# Patient Record
Sex: MALE | Race: WHITE | Employment: OTHER | URBAN - METROPOLITAN AREA
[De-identification: names, ages, dates, MRNs, and addresses within clinical notes are randomized per-mention and may not be internally consistent; named-entity substitution may affect disease eponyms.]

---

## 2017-01-25 ENCOUNTER — HOSPITAL ENCOUNTER (OUTPATIENT)
Dept: GENERAL RADIOLOGY | Age: 82
Discharge: HOME | End: 2017-01-25
Payer: MEDICARE

## 2017-01-25 DIAGNOSIS — M54.17 LUMBOSACRAL NEURITIS: ICD-10-CM

## 2017-01-25 DIAGNOSIS — M54.16 LUMBAR RADICULOPATHY: ICD-10-CM

## 2017-01-25 PROCEDURE — 72110 X-RAY EXAM L-2 SPINE 4/>VWS: CPT

## 2017-08-14 ENCOUNTER — HOSPITAL ENCOUNTER (OUTPATIENT)
Dept: GENERAL RADIOLOGY | Age: 82
Discharge: HOME | End: 2017-08-14
Attending: PHYSICAL MEDICINE & REHABILITATION
Payer: MEDICARE

## 2017-08-14 ENCOUNTER — HOSPITAL ENCOUNTER (OUTPATIENT)
Dept: MRI IMAGING | Age: 82
Discharge: HOME | End: 2017-08-14
Attending: PHYSICAL MEDICINE & REHABILITATION
Payer: MEDICARE

## 2017-08-14 DIAGNOSIS — M54.17 LUMBOSACRAL NEURITIS: ICD-10-CM

## 2017-08-14 DIAGNOSIS — M54.16 LUMBAR RADICULOPATHY: ICD-10-CM

## 2017-08-14 DIAGNOSIS — M54.17 LUMBOSACRAL RADICULITIS: ICD-10-CM

## 2017-08-14 PROCEDURE — 72110 X-RAY EXAM L-2 SPINE 4/>VWS: CPT

## 2017-08-14 PROCEDURE — 72148 MRI LUMBAR SPINE W/O DYE: CPT

## 2017-11-21 ENCOUNTER — HOSPITAL ENCOUNTER (OUTPATIENT)
Dept: PREADMISSION TESTING | Age: 82
Discharge: HOME | End: 2017-11-21
Payer: MEDICARE

## 2017-11-21 VITALS
DIASTOLIC BLOOD PRESSURE: 73 MMHG | RESPIRATION RATE: 18 BRPM | BODY MASS INDEX: 26.93 KG/M2 | HEIGHT: 68 IN | SYSTOLIC BLOOD PRESSURE: 130 MMHG | TEMPERATURE: 97.8 F | HEART RATE: 63 BPM | OXYGEN SATURATION: 97 % | WEIGHT: 177.69 LBS

## 2017-11-21 LAB
ABO + RH BLD: NORMAL
ALBUMIN SERPL-MCNC: 4.1 G/DL (ref 3.5–5)
ALBUMIN/GLOB SERPL: 1.3 {RATIO} (ref 1.1–2.2)
ALP SERPL-CCNC: 88 U/L (ref 45–117)
ALT SERPL-CCNC: 29 U/L (ref 12–78)
ANION GAP SERPL CALC-SCNC: 12 MMOL/L (ref 5–15)
APPEARANCE UR: CLEAR
APTT PPP: 29.7 SEC (ref 22.1–32.5)
AST SERPL-CCNC: 27 U/L (ref 15–37)
BACTERIA URNS QL MICRO: NEGATIVE /HPF
BASOPHILS # BLD: 0 K/UL (ref 0–0.1)
BASOPHILS NFR BLD: 0 % (ref 0–1)
BILIRUB SERPL-MCNC: 0.5 MG/DL (ref 0.2–1)
BILIRUB UR QL: NEGATIVE
BLOOD GROUP ANTIBODIES SERPL: NORMAL
BUN SERPL-MCNC: 13 MG/DL (ref 6–20)
BUN/CREAT SERPL: 14 (ref 12–20)
CALCIUM SERPL-MCNC: 9.3 MG/DL (ref 8.5–10.1)
CHLORIDE SERPL-SCNC: 105 MMOL/L (ref 97–108)
CO2 SERPL-SCNC: 25 MMOL/L (ref 21–32)
COLOR UR: NORMAL
CREAT SERPL-MCNC: 0.94 MG/DL (ref 0.7–1.3)
EOSINOPHIL # BLD: 0.1 K/UL (ref 0–0.4)
EOSINOPHIL NFR BLD: 2 % (ref 0–7)
EPITH CASTS URNS QL MICRO: NORMAL /LPF
ERYTHROCYTE [DISTWIDTH] IN BLOOD BY AUTOMATED COUNT: 13.6 % (ref 11.5–14.5)
EST. AVERAGE GLUCOSE BLD GHB EST-MCNC: 100 MG/DL
GLOBULIN SER CALC-MCNC: 3.1 G/DL (ref 2–4)
GLUCOSE SERPL-MCNC: 85 MG/DL (ref 65–100)
GLUCOSE UR STRIP.AUTO-MCNC: NEGATIVE MG/DL
HBA1C MFR BLD: 5.1 % (ref 4.2–6.3)
HCT VFR BLD AUTO: 41.5 % (ref 36.6–50.3)
HGB BLD-MCNC: 14.3 G/DL (ref 12.1–17)
HGB UR QL STRIP: NEGATIVE
HYALINE CASTS URNS QL MICRO: NORMAL /LPF (ref 0–5)
INR PPP: 1.1 (ref 0.9–1.1)
KETONES UR QL STRIP.AUTO: NEGATIVE MG/DL
LEUKOCYTE ESTERASE UR QL STRIP.AUTO: NEGATIVE
LYMPHOCYTES # BLD: 1.5 K/UL (ref 0.8–3.5)
LYMPHOCYTES NFR BLD: 19 % (ref 12–49)
MCH RBC QN AUTO: 29.7 PG (ref 26–34)
MCHC RBC AUTO-ENTMCNC: 34.5 G/DL (ref 30–36.5)
MCV RBC AUTO: 86.1 FL (ref 80–99)
MONOCYTES # BLD: 0.7 K/UL (ref 0–1)
MONOCYTES NFR BLD: 9 % (ref 5–13)
NEUTS SEG # BLD: 5.7 K/UL (ref 1.8–8)
NEUTS SEG NFR BLD: 70 % (ref 32–75)
NITRITE UR QL STRIP.AUTO: NEGATIVE
PH UR STRIP: 6 [PH] (ref 5–8)
PLATELET # BLD AUTO: 246 K/UL (ref 150–400)
POTASSIUM SERPL-SCNC: 4.2 MMOL/L (ref 3.5–5.1)
PROT SERPL-MCNC: 7.2 G/DL (ref 6.4–8.2)
PROT UR STRIP-MCNC: NEGATIVE MG/DL
PROTHROMBIN TIME: 10.6 SEC (ref 9–11.1)
RBC # BLD AUTO: 4.82 M/UL (ref 4.1–5.7)
RBC #/AREA URNS HPF: NORMAL /HPF (ref 0–5)
SODIUM SERPL-SCNC: 142 MMOL/L (ref 136–145)
SP GR UR REFRACTOMETRY: 1 (ref 1–1.03)
SPECIMEN EXP DATE BLD: NORMAL
THERAPEUTIC RANGE,PTTT: NORMAL SECS (ref 58–77)
UA: UC IF INDICATED,UAUC: NORMAL
UROBILINOGEN UR QL STRIP.AUTO: 0.2 EU/DL (ref 0.2–1)
WBC # BLD AUTO: 8.1 K/UL (ref 4.1–11.1)
WBC URNS QL MICRO: NORMAL /HPF (ref 0–4)

## 2017-11-21 PROCEDURE — 83036 HEMOGLOBIN GLYCOSYLATED A1C: CPT | Performed by: NEUROLOGICAL SURGERY

## 2017-11-21 PROCEDURE — 85730 THROMBOPLASTIN TIME PARTIAL: CPT | Performed by: NEUROLOGICAL SURGERY

## 2017-11-21 PROCEDURE — 81001 URINALYSIS AUTO W/SCOPE: CPT | Performed by: NEUROLOGICAL SURGERY

## 2017-11-21 PROCEDURE — 80053 COMPREHEN METABOLIC PANEL: CPT | Performed by: NEUROLOGICAL SURGERY

## 2017-11-21 PROCEDURE — 85610 PROTHROMBIN TIME: CPT | Performed by: NEUROLOGICAL SURGERY

## 2017-11-21 PROCEDURE — 36415 COLL VENOUS BLD VENIPUNCTURE: CPT | Performed by: NEUROLOGICAL SURGERY

## 2017-11-21 PROCEDURE — 85025 COMPLETE CBC W/AUTO DIFF WBC: CPT | Performed by: NEUROLOGICAL SURGERY

## 2017-11-21 PROCEDURE — 86900 BLOOD TYPING SEROLOGIC ABO: CPT | Performed by: NEUROLOGICAL SURGERY

## 2017-11-21 PROCEDURE — 93005 ELECTROCARDIOGRAM TRACING: CPT

## 2017-11-21 RX ORDER — LISINOPRIL 10 MG/1
10 TABLET ORAL
COMMUNITY
End: 2018-02-24

## 2017-11-21 RX ORDER — METOPROLOL TARTRATE 50 MG/1
50 TABLET ORAL
COMMUNITY
End: 2018-02-24

## 2017-11-21 RX ORDER — TAMSULOSIN HYDROCHLORIDE 0.4 MG/1
0.4 CAPSULE ORAL
COMMUNITY

## 2017-11-21 NOTE — PERIOP NOTES
[facility]  PREOPERATIVE INSTRUCTIONS    Surgery Date:   11/28/2017 Tuesday   Surgery arrival time given by surgeon: NO  (If [facility] staff will call you between 3pm - 7pm the day before surgery with your arrival time. If your surgery is on a Monday, we will call you the preceding Friday. Please call [phone] after 7pm if you did not receive your arrival time.)  1. Report  to the 2nd Floor Admitting Desk on the day of your surgery. Bring your insurance card, photo identification, and any copayment (if applicable). 2. You must have a responsible adult to drive you home and stay with you the first 24 hours after surgery if you are going home the same day of your surgery. 3. Nothing to eat or drink after midnight the night before surgery. This means NO water, gum, mints, coffee, juice, etc.    4. MEDICATIONS TO TAKE THE MORNING OF SURGERY WITH A SIP OF WATER: Take Metoprolol and tamsulosin. Do not take your lisinopril the morning of surgery. 5. No alcoholic beverages 24 hours before and after your surgery. 6. If you are being admitted to the hospital,please leave personal belongings/luggage in your car until you have an assigned hospital room number. ( The hospital discharge time is 12 PM NOON. Your adult  should be at the hospital prior to the noon discharge time unless otherwise instructed.)   7. STOP Aspirin and/or any non-steroidal anti-inflammatory drugs (i.e. Ibuprofen, Naproxen, Advil, Aleve) as directed by your surgeon. You may take Tylenol. Stop herbal supplements 1 week prior to  surgery. 8. If you are currently taking Plavix, Coumadin,or any other blood-thinning/ anticoagulant medication contact your surgeon for instructions. 9. Wear comfortable clothes. Wear your glasses instead of contacts. Please leave all money, jewelry and valuables at home. No make up, particularly mascara, the day of surgery. 10.  REMOVE ALL body piercings, rings,and jewelry and leave at home.   Wear your hair loose or down, no pony-tails, buns, or any metal hair clips. 11. If you shower the morning of surgery, please do not apply any lotions, powders, or deodorants afterwards. Do not shave any body area within 24 hours of your surgery. 12. Please follow all instructions to avoid any potential surgical cancellation. 13. Should your physical condition change, (i.e. fever, cold, flu, etc.) please notify your surgeon as soon as possible. 14. It is important to be on time. If a situation occurs where you may be delayed, please call:  [phone] / [phone] on the day of surgery. 15. The Preadmission Testing staff can be reached at [phone]. 16.  Special Instructions:  · Use Chlorhexidine Care Fusion wash and sponges 3 days prior to surgery as instructed. · Incentive spirometer given with instructions to practice at home and bring back to the hospital on the day of surgery. · [facility] will contact you if your Hemoglobin A1C is greater than 7.5. · Ensure/Glucerna  sample, nutritional information, and Ensure/Glucerna coupon given. · Pain pamphlet and Call Don't Fall reminder reviewed with patient. ·  parking is complimentary Monday - Friday 7 am - 5 pm  · Bring PTA Medication list day of surgery with the last doses taken documented   Do not bring medication bottles the day of [id number]. Special Instructions:  17. The patient was contacted  in person. He verbalized understanding of all instructions does not need reinforcement.

## 2017-11-21 NOTE — H&P
PAT Pre-Op History & Physical    Patient: Babita Truong                  MRN: [id number]          SSN: [id number]  YOB: 1935          Age: 81 y.o. Sex: male                Subjective:     Patient is a 81 y.o.  male who presents with history of back pain that has been progressing for ten years. Three years ago received an injection that helped for awhile. Recent injections have stopped working. The pain is across his lower back and down into both legs all the way to his feet. He has numbness in both feet upon wakening. It is hard for him to turn over in bed. Too much physical activity makes the pain worse. He rates his pain between a 3/10 and 6/10 and describes it as an ache. He has failed injections, NSAID and application of heat. The patient was evaluated in the surgeon's office and it was determined that the most appropriate plan of care is to proceed with surgical intervention. Patient's PCP Rah Baldwin MD    Patient stated he was going to get dental implants starting in December. MRI done 8/14/17 with findings of 1. Severe spinal stenosis L2-3 with minimal progression since 2014. 2. Moderately severe to severe spinal stenosis L3-4 with minimal progression since 2014. 3. L4-5 at least mild central stenosis with more severe left subarticular zone and foramina stenosis. Minimal if any progression. 4. L5-S1 left greater than right foramina stenosis. 5. L1-2 mild/moderate stenosis. Past Medical History:   Diagnosis Date    Hypertension     PUD (peptic ulcer disease)     Pt states he had a 'peptic ulcer' in his early 25s      Past Surgical History:   Procedure Laterality Date    HX CATARACT REMOVAL Bilateral 2015    HX HERNIA REPAIR Right 02/2015    Right inguinal hernia repair    HX KNEE REPLACEMENT Right 11/2016    HX KNEE REPLACEMENT Left 07/2016      Prior to Admission medications    Medication Sig Start Date End Date Taking?  Authorizing Provider lisinopril (PRINIVIL, ZESTRIL) 10 mg tablet Take 10 mg by mouth every morning. Yes Historical Provider   metoprolol tartrate (LOPRESSOR) 50 mg tablet Take 50 mg by mouth every morning. Yes Historical Provider   tamsulosin (FLOMAX) 0.4 mg capsule Take 0.4 mg by mouth every morning. Takes for 'toenail fungus'   Yes Historical Provider     Current Outpatient Prescriptions   Medication Sig    lisinopril (PRINIVIL, ZESTRIL) 10 mg tablet Take 10 mg by mouth every morning.  metoprolol tartrate (LOPRESSOR) 50 mg tablet Take 50 mg by mouth every morning.  tamsulosin (FLOMAX) 0.4 mg capsule Take 0.4 mg by mouth every morning. Takes for 'toenail fungus'     No current facility-administered medications for this encounter. 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 Patient denies any recent dental procedures or any planned prior to surgery. Patient denies chest pain, tightness, pain radiating down left arm, palpitations. Denies dizziness, visual disturbances, or lightheadedness. Patient denies shortness of breath, wheezing, cough, fever, or chills. Patient denies diarrhea, constipation, or abdominal pain. Patient denies urinary problems including dysuria, hesitancy, urgency, or incontinence. Denies skin breakdown, rashes, insect bites or open area.          Objective:     Patient Vitals for the past 24 hrs:   Temp Pulse Resp BP SpO2   17 1127 97.8 °F (36.6 °C) 63 18 130/73 97 %     Temp (24hrs), Av.8 °F (36.6 °C), Min:97.8 °F (36.6 °C), Max:97.8 °F (36.6 °C)    Body mass index is 27.02 kg/(m^2). Wt Readings from Last 1 Encounters:   11/21/17 80.6 kg (177 lb 11.1 oz)        Physical Exam:     General: Pleasant,  cooperative, no apparent distress, appears stated age. Eyes: Conjunctivae/corneas clear. EOMs intact. Nose: Nares normal.   Mouth/Throat: Lips, mucosa, and tongue normal. Upper and Lower Partials. Lungs: Clear to auscultation bilaterally. Heart: Regular rate and rhythm, S1, S2 normal. No murmur, click, rub or gallop. Abdomen: Soft, non-tender. Bowel sounds normal. No distention. Musculoskeletal:  Decreased ROM to lumbar area. Extremities:  Extremities normal, atraumatic, no cyanosis or edema. Calves                                 supple, non tender to palpation. Pulses: 2+ and symmetric bilateral upper extremities. Cap. refill <2 seconds   Skin: Skin color, texture, turgor normal. No visible open areas, examined fully clothed   Neurologic: CN II-XII grossly intact. Alert and oriented x3. Labs:   Recent Results (from the past 72 hour(s))   CULTURE, MRSA    Collection Time: 11/21/17 11:28 AM   Result Value Ref Range    Special Requests: NO SPECIAL REQUESTS      Culture result: MRSA NOT PRESENT AT 12 HOURS     CBC WITH AUTOMATED DIFF    Collection Time: 11/21/17 12:20 PM   Result Value Ref Range    WBC 8.1 4.1 - 11.1 K/uL    RBC 4.82 4.10 - 5.70 M/uL    HGB 14.3 12.1 - 17.0 g/dL    HCT 41.5 36.6 - 50.3 %    MCV 86.1 80.0 - 99.0 FL    MCH 29.7 26.0 - 34.0 PG    MCHC 34.5 30.0 - 36.5 g/dL    RDW 13.6 11.5 - 14.5 %    PLATELET 301 840 - 517 K/uL    NEUTROPHILS 70 32 - 75 %    LYMPHOCYTES 19 12 - 49 %    MONOCYTES 9 5 - 13 %    EOSINOPHILS 2 0 - 7 %    BASOPHILS 0 0 - 1 %    ABS. NEUTROPHILS 5.7 1.8 - 8.0 K/UL    ABS. LYMPHOCYTES 1.5 0.8 - 3.5 K/UL    ABS. MONOCYTES 0.7 0.0 - 1.0 K/UL    ABS. EOSINOPHILS 0.1 0.0 - 0.4 K/UL    ABS.  BASOPHILS 0.0 0.0 - 0.1 K/UL   METABOLIC PANEL, COMPREHENSIVE    Collection Time: 11/21/17 12:20 PM   Result Value Ref Range    Sodium 142 136 - 145 mmol/L    Potassium 4.2 3.5 - 5.1 mmol/L    Chloride 105 97 - 108 mmol/L    CO2 25 21 - 32 mmol/L    Anion gap 12 5 - 15 mmol/L    Glucose 85 65 - 100 mg/dL    BUN 13 6 - 20 MG/DL    Creatinine 0.94 0.70 - 1.30 MG/DL    BUN/Creatinine ratio 14 12 - 20      GFR est AA >60 >60 ml/min/1.73m2    GFR est non-AA >60 >60 ml/min/1.73m2    Calcium 9.3 8.5 - 10.1 MG/DL    Bilirubin, total 0.5 0.2 - 1.0 MG/DL    ALT (SGPT) 29 12 - 78 U/L    AST (SGOT) 27 15 - 37 U/L    Alk.  phosphatase 88 45 - 117 U/L    Protein, total 7.2 6.4 - 8.2 g/dL    Albumin 4.1 3.5 - 5.0 g/dL    Globulin 3.1 2.0 - 4.0 g/dL    A-G Ratio 1.3 1.1 - 2.2     HEMOGLOBIN A1C WITH EAG    Collection Time: 11/21/17 12:20 PM   Result Value Ref Range    Hemoglobin A1c 5.1 4.2 - 6.3 %    Est. average glucose 100 mg/dL   PROTHROMBIN TIME + INR    Collection Time: 11/21/17 12:20 PM   Result Value Ref Range    INR 1.1 0.9 - 1.1      Prothrombin time 10.6 9.0 - 11.1 sec   PTT    Collection Time: 11/21/17 12:20 PM   Result Value Ref Range    aPTT 29.7 22.1 - 32.5 sec    aPTT, therapeutic range     58.0 - 77.0 SECS   URINALYSIS W/ REFLEX CULTURE    Collection Time: 11/21/17 12:20 PM   Result Value Ref Range    Color YELLOW/STRAW      Appearance CLEAR CLEAR      Specific gravity 1.005 1.003 - 1.030      pH (UA) 6.0 5.0 - 8.0      Protein NEGATIVE  NEG mg/dL    Glucose NEGATIVE  NEG mg/dL    Ketone NEGATIVE  NEG mg/dL    Bilirubin NEGATIVE  NEG      Blood NEGATIVE  NEG      Urobilinogen 0.2 0.2 - 1.0 EU/dL    Nitrites NEGATIVE  NEG      Leukocyte Esterase NEGATIVE  NEG      WBC 0-4 0 - 4 /hpf    RBC 0-5 0 - 5 /hpf    Epithelial cells FEW FEW /lpf    Bacteria NEGATIVE  NEG /hpf    UA:UC IF INDICATED CULTURE NOT INDICATED BY UA RESULT CNI      Hyaline cast 0-2 0 - 5 /lpf   TYPE & SCREEN    Collection Time: 11/21/17 12:20 PM   Result Value Ref Range    Crossmatch Expiration 12/01/2017     ABO/Rh(D) B NEGATIVE     Antibody screen NEG    EKG, 12 LEAD, INITIAL    Collection Time: 11/21/17 12:46 PM   Result Value Ref Range    Ventricular Rate 57 BPM    Atrial Rate 57 BPM    P-R Interval 196 ms    QRS Duration 98 ms    Q-T Interval 412 ms    QTC Calculation (Bezet) 401 ms    Calculated P Axis -16 degrees    Calculated R Axis 19 degrees    Calculated T Axis 33 degrees    Diagnosis       Sinus bradycardia  Cannot rule out Anterior infarct , age undetermined  Abnormal ECG  When compared with ECG of 14-FEB-2015 18:14,  premature atrial complexes are no longer present  Vent. rate has decreased BY  61 BPM         Assessment:     Lumbar Stenosis with Claudication    Plan:     Scheduled for L2-3 L3-4 LAMINECTOMY AND BILATERAL FORAMINOTOMIES. EKG reviewed    Labs reviewed and unremarkable    MRSA Negative    Educated patient to speak to attending surgeon prior to having any planned dental procedures after surgery.      Alfredo Blake NP

## 2017-11-22 LAB
ATRIAL RATE: 57 BPM
BACTERIA SPEC CULT: NORMAL
BACTERIA SPEC CULT: NORMAL
CALCULATED P AXIS, ECG09: -16 DEGREES
CALCULATED R AXIS, ECG10: 19 DEGREES
CALCULATED T AXIS, ECG11: 33 DEGREES
DIAGNOSIS, 93000: NORMAL
P-R INTERVAL, ECG05: 196 MS
Q-T INTERVAL, ECG07: 412 MS
QRS DURATION, ECG06: 98 MS
QTC CALCULATION (BEZET), ECG08: 401 MS
SERVICE CMNT-IMP: NORMAL
VENTRICULAR RATE, ECG03: 57 BPM

## 2017-11-27 ENCOUNTER — ANESTHESIA EVENT (OUTPATIENT)
Dept: SURGERY | Age: 82
DRG: 517 | End: 2017-11-27
Payer: MEDICARE

## 2017-11-28 ENCOUNTER — ANESTHESIA (OUTPATIENT)
Dept: SURGERY | Age: 82
DRG: 517 | End: 2017-11-28
Payer: MEDICARE

## 2017-11-28 ENCOUNTER — HOSPITAL ENCOUNTER (INPATIENT)
Age: 82
LOS: 1 days | Discharge: HOME | DRG: 517 | End: 2017-11-29
Attending: NEUROLOGICAL SURGERY | Admitting: NEUROLOGICAL SURGERY
Payer: MEDICARE

## 2017-11-28 ENCOUNTER — APPOINTMENT (OUTPATIENT)
Dept: GENERAL RADIOLOGY | Age: 82
DRG: 517 | End: 2017-11-28
Attending: NEUROLOGICAL SURGERY
Payer: MEDICARE

## 2017-11-28 DIAGNOSIS — M48.062 LUMBAR STENOSIS WITH NEUROGENIC CLAUDICATION: Primary | ICD-10-CM

## 2017-11-28 PROCEDURE — 77030034849: Performed by: NEUROLOGICAL SURGERY

## 2017-11-28 PROCEDURE — 77030004391 HC BUR FLUT MEDT -C: Performed by: NEUROLOGICAL SURGERY

## 2017-11-28 PROCEDURE — 77030020256 HC SOL INJ NACL 0.9%  500ML: Performed by: NEUROLOGICAL SURGERY

## 2017-11-28 PROCEDURE — 77030026438 HC STYL ET INTUB CARD -A: Performed by: ANESTHESIOLOGY

## 2017-11-28 PROCEDURE — 77030032490 HC SLV COMPR SCD KNE COVD -B: Performed by: NEUROLOGICAL SURGERY

## 2017-11-28 PROCEDURE — 74011000250 HC RX REV CODE- 250: Performed by: NEUROLOGICAL SURGERY

## 2017-11-28 PROCEDURE — 74011000250 HC RX REV CODE- 250

## 2017-11-28 PROCEDURE — 77030011264 HC ELECTRD BLD EXT COVD -A: Performed by: NEUROLOGICAL SURGERY

## 2017-11-28 PROCEDURE — 76060000036 HC ANESTHESIA 2.5 TO 3 HR: Performed by: NEUROLOGICAL SURGERY

## 2017-11-28 PROCEDURE — 77030010507 HC ADH SKN DERMBND J&J -B: Performed by: NEUROLOGICAL SURGERY

## 2017-11-28 PROCEDURE — 77030003666 HC NDL SPINAL BD -A: Performed by: NEUROLOGICAL SURGERY

## 2017-11-28 PROCEDURE — 77030020782 HC GWN BAIR PAWS FLX 3M -B

## 2017-11-28 PROCEDURE — 77030013079 HC BLNKT BAIR HGGR 3M -A: Performed by: ANESTHESIOLOGY

## 2017-11-28 PROCEDURE — 77030003029 HC SUT VCRL J&J -B: Performed by: NEUROLOGICAL SURGERY

## 2017-11-28 PROCEDURE — 77030038222 HC BUR MIDSREX MEDT -D: Performed by: NEUROLOGICAL SURGERY

## 2017-11-28 PROCEDURE — 74011250636 HC RX REV CODE- 250/636: Performed by: NEUROLOGICAL SURGERY

## 2017-11-28 PROCEDURE — 74011250636 HC RX REV CODE- 250/636

## 2017-11-28 PROCEDURE — 77030019908 HC STETH ESOPH SIMS -A: Performed by: ANESTHESIOLOGY

## 2017-11-28 PROCEDURE — 74011250636 HC RX REV CODE- 250/636: Performed by: ANESTHESIOLOGY

## 2017-11-28 PROCEDURE — 77030032490 HC SLV COMPR SCD KNE COVD -B

## 2017-11-28 PROCEDURE — 51798 US URINE CAPACITY MEASURE: CPT

## 2017-11-28 PROCEDURE — 65270000029 HC RM PRIVATE

## 2017-11-28 PROCEDURE — 77030014647 HC SEAL FBRN TISSL BAXT -D: Performed by: NEUROLOGICAL SURGERY

## 2017-11-28 PROCEDURE — 74011250637 HC RX REV CODE- 250/637: Performed by: NEUROLOGICAL SURGERY

## 2017-11-28 PROCEDURE — 77030018836 HC SOL IRR NACL ICUM -A: Performed by: NEUROLOGICAL SURGERY

## 2017-11-28 PROCEDURE — 01NB0ZZ RELEASE LUMBAR NERVE, OPEN APPROACH: ICD-10-PCS | Performed by: NEUROLOGICAL SURGERY

## 2017-11-28 PROCEDURE — 77030013474 HC CRD BPLR DISP ADLR -A: Performed by: NEUROLOGICAL SURGERY

## 2017-11-28 PROCEDURE — 77030002933 HC SUT MCRYL J&J -A: Performed by: NEUROLOGICAL SURGERY

## 2017-11-28 PROCEDURE — 76210000007 HC OR PH I REC 5.5 TO 6 HR: Performed by: NEUROLOGICAL SURGERY

## 2017-11-28 PROCEDURE — 77030029099 HC BN WAX SSPC -A: Performed by: NEUROLOGICAL SURGERY

## 2017-11-28 PROCEDURE — 77030037134 HC WRAP COMPR BACK THER SOLM -B

## 2017-11-28 PROCEDURE — 76010000172 HC OR TIME 2.5 TO 3 HR INTENSV-TIER 1: Performed by: NEUROLOGICAL SURGERY

## 2017-11-28 PROCEDURE — 76000 FLUOROSCOPY <1 HR PHYS/QHP: CPT

## 2017-11-28 PROCEDURE — 77030008684 HC TU ET CUF COVD -B: Performed by: ANESTHESIOLOGY

## 2017-11-28 PROCEDURE — 74011000272 HC RX REV CODE- 272: Performed by: NEUROLOGICAL SURGERY

## 2017-11-28 RX ORDER — SODIUM CHLORIDE 0.9 % (FLUSH) 0.9 %
5-10 SYRINGE (ML) INJECTION AS NEEDED
Status: DISCONTINUED | OUTPATIENT
Start: 2017-11-28 | End: 2017-11-28 | Stop reason: HOSPADM

## 2017-11-28 RX ORDER — SODIUM CHLORIDE 0.9 % (FLUSH) 0.9 %
5-10 SYRINGE (ML) INJECTION EVERY 8 HOURS
Status: DISCONTINUED | OUTPATIENT
Start: 2017-11-28 | End: 2017-11-28 | Stop reason: HOSPADM

## 2017-11-28 RX ORDER — SODIUM CHLORIDE 0.9 % (FLUSH) 0.9 %
5-10 SYRINGE (ML) INJECTION AS NEEDED
Status: DISCONTINUED | OUTPATIENT
Start: 2017-11-28 | End: 2017-11-29 | Stop reason: HOSPADM

## 2017-11-28 RX ORDER — SODIUM CHLORIDE 9 MG/ML
125 INJECTION, SOLUTION INTRAVENOUS CONTINUOUS
Status: DISPENSED | OUTPATIENT
Start: 2017-11-28 | End: 2017-11-29

## 2017-11-28 RX ORDER — AMOXICILLIN 250 MG
1 CAPSULE ORAL DAILY
Status: DISCONTINUED | OUTPATIENT
Start: 2017-11-29 | End: 2017-11-29 | Stop reason: HOSPADM

## 2017-11-28 RX ORDER — IBUPROFEN 600 MG/1
600 TABLET ORAL
Status: DISCONTINUED | OUTPATIENT
Start: 2017-11-28 | End: 2017-11-29 | Stop reason: HOSPADM

## 2017-11-28 RX ORDER — ONDANSETRON 2 MG/ML
INJECTION INTRAMUSCULAR; INTRAVENOUS AS NEEDED
Status: DISCONTINUED | OUTPATIENT
Start: 2017-11-28 | End: 2017-11-28 | Stop reason: HOSPADM

## 2017-11-28 RX ORDER — HYDROCODONE BITARTRATE AND ACETAMINOPHEN 5; 325 MG/1; MG/1
1 TABLET ORAL
Status: DISCONTINUED | OUTPATIENT
Start: 2017-11-28 | End: 2017-11-29 | Stop reason: HOSPADM

## 2017-11-28 RX ORDER — BUPIVACAINE HYDROCHLORIDE AND EPINEPHRINE 5; 5 MG/ML; UG/ML
INJECTION, SOLUTION EPIDURAL; INTRACAUDAL; PERINEURAL AS NEEDED
Status: DISCONTINUED | OUTPATIENT
Start: 2017-11-28 | End: 2017-11-28 | Stop reason: HOSPADM

## 2017-11-28 RX ORDER — ACETAMINOPHEN 325 MG/1
650 TABLET ORAL EVERY 6 HOURS
Status: DISCONTINUED | OUTPATIENT
Start: 2017-11-28 | End: 2017-11-29 | Stop reason: HOSPADM

## 2017-11-28 RX ORDER — NALOXONE HYDROCHLORIDE 0.4 MG/ML
0.4 INJECTION, SOLUTION INTRAMUSCULAR; INTRAVENOUS; SUBCUTANEOUS AS NEEDED
Status: DISCONTINUED | OUTPATIENT
Start: 2017-11-28 | End: 2017-11-29 | Stop reason: HOSPADM

## 2017-11-28 RX ORDER — NEOSTIGMINE METHYLSULFATE 1 MG/ML
INJECTION INTRAVENOUS AS NEEDED
Status: DISCONTINUED | OUTPATIENT
Start: 2017-11-28 | End: 2017-11-28 | Stop reason: HOSPADM

## 2017-11-28 RX ORDER — DIPHENHYDRAMINE HCL 12.5MG/5ML
12.5 ELIXIR ORAL
Status: DISCONTINUED | OUTPATIENT
Start: 2017-11-28 | End: 2017-11-29 | Stop reason: HOSPADM

## 2017-11-28 RX ORDER — CEFAZOLIN SODIUM IN 0.9 % NACL 2 G/50 ML
2 INTRAVENOUS SOLUTION, PIGGYBACK (ML) INTRAVENOUS ONCE
Status: COMPLETED | OUTPATIENT
Start: 2017-11-28 | End: 2017-11-28

## 2017-11-28 RX ORDER — LIDOCAINE HYDROCHLORIDE 10 MG/ML
0.1 INJECTION, SOLUTION EPIDURAL; INFILTRATION; INTRACAUDAL; PERINEURAL AS NEEDED
Status: DISCONTINUED | OUTPATIENT
Start: 2017-11-28 | End: 2017-11-28 | Stop reason: HOSPADM

## 2017-11-28 RX ORDER — METOPROLOL TARTRATE 5 MG/5ML
INJECTION INTRAVENOUS AS NEEDED
Status: DISCONTINUED | OUTPATIENT
Start: 2017-11-28 | End: 2017-11-28 | Stop reason: HOSPADM

## 2017-11-28 RX ORDER — SODIUM CHLORIDE 0.9 % (FLUSH) 0.9 %
5-10 SYRINGE (ML) INJECTION EVERY 8 HOURS
Status: DISCONTINUED | OUTPATIENT
Start: 2017-11-29 | End: 2017-11-29 | Stop reason: HOSPADM

## 2017-11-28 RX ORDER — TAMSULOSIN HYDROCHLORIDE 0.4 MG/1
0.4 CAPSULE ORAL
Status: DISCONTINUED | OUTPATIENT
Start: 2017-11-29 | End: 2017-11-29 | Stop reason: HOSPADM

## 2017-11-28 RX ORDER — ROCURONIUM BROMIDE 10 MG/ML
INJECTION, SOLUTION INTRAVENOUS AS NEEDED
Status: DISCONTINUED | OUTPATIENT
Start: 2017-11-28 | End: 2017-11-28 | Stop reason: HOSPADM

## 2017-11-28 RX ORDER — MORPHINE SULFATE 2 MG/ML
2 INJECTION, SOLUTION INTRAMUSCULAR; INTRAVENOUS
Status: DISCONTINUED | OUTPATIENT
Start: 2017-11-28 | End: 2017-11-29 | Stop reason: HOSPADM

## 2017-11-28 RX ORDER — PROPOFOL 10 MG/ML
INJECTION, EMULSION INTRAVENOUS AS NEEDED
Status: DISCONTINUED | OUTPATIENT
Start: 2017-11-28 | End: 2017-11-28 | Stop reason: HOSPADM

## 2017-11-28 RX ORDER — METHYLPREDNISOLONE ACETATE 40 MG/ML
INJECTION, SUSPENSION INTRA-ARTICULAR; INTRALESIONAL; INTRAMUSCULAR; SOFT TISSUE AS NEEDED
Status: DISCONTINUED | OUTPATIENT
Start: 2017-11-28 | End: 2017-11-28 | Stop reason: HOSPADM

## 2017-11-28 RX ORDER — METOPROLOL SUCCINATE 50 MG/1
50 TABLET, EXTENDED RELEASE ORAL DAILY
Status: DISCONTINUED | OUTPATIENT
Start: 2017-11-28 | End: 2017-11-28 | Stop reason: SDUPTHER

## 2017-11-28 RX ORDER — GLYCOPYRROLATE 0.2 MG/ML
INJECTION INTRAMUSCULAR; INTRAVENOUS AS NEEDED
Status: DISCONTINUED | OUTPATIENT
Start: 2017-11-28 | End: 2017-11-28 | Stop reason: HOSPADM

## 2017-11-28 RX ORDER — METOPROLOL TARTRATE 50 MG/1
50 TABLET ORAL
Status: DISCONTINUED | OUTPATIENT
Start: 2017-11-29 | End: 2017-11-29 | Stop reason: HOSPADM

## 2017-11-28 RX ORDER — HYDROMORPHONE HYDROCHLORIDE 1 MG/ML
.25-1 INJECTION, SOLUTION INTRAMUSCULAR; INTRAVENOUS; SUBCUTANEOUS
Status: DISCONTINUED | OUTPATIENT
Start: 2017-11-28 | End: 2017-11-28 | Stop reason: HOSPADM

## 2017-11-28 RX ORDER — MIDAZOLAM HYDROCHLORIDE 1 MG/ML
INJECTION, SOLUTION INTRAMUSCULAR; INTRAVENOUS AS NEEDED
Status: DISCONTINUED | OUTPATIENT
Start: 2017-11-28 | End: 2017-11-28 | Stop reason: HOSPADM

## 2017-11-28 RX ORDER — LISINOPRIL 20 MG/1
10 TABLET ORAL
Status: DISCONTINUED | OUTPATIENT
Start: 2017-11-29 | End: 2017-11-29 | Stop reason: HOSPADM

## 2017-11-28 RX ORDER — SODIUM CHLORIDE, SODIUM LACTATE, POTASSIUM CHLORIDE, CALCIUM CHLORIDE 600; 310; 30; 20 MG/100ML; MG/100ML; MG/100ML; MG/100ML
100 INJECTION, SOLUTION INTRAVENOUS CONTINUOUS
Status: DISCONTINUED | OUTPATIENT
Start: 2017-11-28 | End: 2017-11-28 | Stop reason: HOSPADM

## 2017-11-28 RX ORDER — FENTANYL CITRATE 50 UG/ML
INJECTION, SOLUTION INTRAMUSCULAR; INTRAVENOUS AS NEEDED
Status: DISCONTINUED | OUTPATIENT
Start: 2017-11-28 | End: 2017-11-28 | Stop reason: HOSPADM

## 2017-11-28 RX ORDER — DOCUSATE SODIUM 100 MG/1
100 CAPSULE, LIQUID FILLED ORAL 2 TIMES DAILY
Status: DISCONTINUED | OUTPATIENT
Start: 2017-11-28 | End: 2017-11-29 | Stop reason: HOSPADM

## 2017-11-28 RX ORDER — DEXAMETHASONE SODIUM PHOSPHATE 4 MG/ML
INJECTION, SOLUTION INTRA-ARTICULAR; INTRALESIONAL; INTRAMUSCULAR; INTRAVENOUS; SOFT TISSUE AS NEEDED
Status: DISCONTINUED | OUTPATIENT
Start: 2017-11-28 | End: 2017-11-28 | Stop reason: HOSPADM

## 2017-11-28 RX ORDER — CEFAZOLIN SODIUM IN 0.9 % NACL 2 G/50 ML
2 INTRAVENOUS SOLUTION, PIGGYBACK (ML) INTRAVENOUS EVERY 8 HOURS
Status: COMPLETED | OUTPATIENT
Start: 2017-11-28 | End: 2017-11-29

## 2017-11-28 RX ORDER — ONDANSETRON 2 MG/ML
4 INJECTION INTRAMUSCULAR; INTRAVENOUS
Status: DISCONTINUED | OUTPATIENT
Start: 2017-11-28 | End: 2017-11-29 | Stop reason: HOSPADM

## 2017-11-28 RX ORDER — LIDOCAINE HYDROCHLORIDE 20 MG/ML
INJECTION, SOLUTION EPIDURAL; INFILTRATION; INTRACAUDAL; PERINEURAL AS NEEDED
Status: DISCONTINUED | OUTPATIENT
Start: 2017-11-28 | End: 2017-11-28 | Stop reason: HOSPADM

## 2017-11-28 RX ADMIN — PROPOFOL 80 MG: 10 INJECTION, EMULSION INTRAVENOUS at 07:41

## 2017-11-28 RX ADMIN — METOPROLOL TARTRATE 1 MG: 5 INJECTION INTRAVENOUS at 07:43

## 2017-11-28 RX ADMIN — CEFAZOLIN 2 G: 1 INJECTION, POWDER, FOR SOLUTION INTRAMUSCULAR; INTRAVENOUS; PARENTERAL at 07:48

## 2017-11-28 RX ADMIN — DEXAMETHASONE SODIUM PHOSPHATE 4 MG: 4 INJECTION, SOLUTION INTRA-ARTICULAR; INTRALESIONAL; INTRAMUSCULAR; INTRAVENOUS; SOFT TISSUE at 07:45

## 2017-11-28 RX ADMIN — SODIUM CHLORIDE, SODIUM LACTATE, POTASSIUM CHLORIDE, AND CALCIUM CHLORIDE: 600; 310; 30; 20 INJECTION, SOLUTION INTRAVENOUS at 09:15

## 2017-11-28 RX ADMIN — GLYCOPYRROLATE 0.3 MG: 0.2 INJECTION INTRAMUSCULAR; INTRAVENOUS at 09:51

## 2017-11-28 RX ADMIN — MIDAZOLAM HYDROCHLORIDE 1 MG: 1 INJECTION, SOLUTION INTRAMUSCULAR; INTRAVENOUS at 07:36

## 2017-11-28 RX ADMIN — NEOSTIGMINE METHYLSULFATE 2 MG: 1 INJECTION INTRAVENOUS at 09:52

## 2017-11-28 RX ADMIN — SODIUM CHLORIDE, SODIUM LACTATE, POTASSIUM CHLORIDE, AND CALCIUM CHLORIDE 100 ML/HR: 600; 310; 30; 20 INJECTION, SOLUTION INTRAVENOUS at 06:29

## 2017-11-28 RX ADMIN — FENTANYL CITRATE 50 MCG: 50 INJECTION, SOLUTION INTRAMUSCULAR; INTRAVENOUS at 09:29

## 2017-11-28 RX ADMIN — LIDOCAINE HYDROCHLORIDE 60 MG: 20 INJECTION, SOLUTION EPIDURAL; INFILTRATION; INTRACAUDAL; PERINEURAL at 07:41

## 2017-11-28 RX ADMIN — ROCURONIUM BROMIDE 50 MG: 10 INJECTION, SOLUTION INTRAVENOUS at 07:42

## 2017-11-28 RX ADMIN — CEFAZOLIN 2 G: 1 INJECTION, POWDER, FOR SOLUTION INTRAMUSCULAR; INTRAVENOUS; PARENTERAL at 20:07

## 2017-11-28 RX ADMIN — ONDANSETRON 4 MG: 2 INJECTION INTRAMUSCULAR; INTRAVENOUS at 09:47

## 2017-11-28 RX ADMIN — FENTANYL CITRATE 100 MCG: 50 INJECTION, SOLUTION INTRAMUSCULAR; INTRAVENOUS at 07:41

## 2017-11-28 RX ADMIN — SODIUM CHLORIDE 125 ML/HR: 900 INJECTION, SOLUTION INTRAVENOUS at 10:26

## 2017-11-28 RX ADMIN — DOCUSATE SODIUM 100 MG: 100 CAPSULE, LIQUID FILLED ORAL at 20:07

## 2017-11-28 RX ADMIN — ACETAMINOPHEN 650 MG: 325 TABLET ORAL at 20:07

## 2017-11-28 NOTE — PERIOP NOTES
TRANSFER - IN REPORT:    Verbal report received from India Hua RN(name) on Katie Velazquez  being received from 406(unit) for routine progression of care      Report consisted of patients Situation, Background, Assessment and   Recommendations(SBAR). Information from the following report(s) SBAR, Kardex, Procedure Summary, Intake/Output, MAR and Cardiac Rhythm RSR was reviewed with the receiving nurse. Opportunity for questions and clarification was provided. Assessment completed upon patients arrival to unit and care assumed.

## 2017-11-28 NOTE — OP NOTES
[facility]   [address] [facility] [place], [address]   OP NOTE       Name:  Jez Beard   MR#:  [id number]   :  1935   Account #:  [de-identified]    Surgery Date:  2017   Date of Adm:  2017       PREOPERATIVE DIAGNOSIS: Lumbar stenosis, claudication,   radiculopathy. POSTOPERATIVE DIAGNOSIS: Lumbar stenosis, claudication,   radiculopathy. PROCEDURES PERFORMED: Minimally invasive L2-3 and L3-4   laminectomy and bilateral foraminotomies with use of the operating   microscope. SURGEON: Joel Elder. Martina Velarde MD    ESTIMATED BLOOD LOSS: 25 mL. SPECIMENS REMOVED: None. ANESTHESIA: General.     FINDINGS: Ligamentous hypertrophy. COMPLICATIONS: None. INDICATIONS: The patient is an 28-year-old gentleman who has had   progressive symptoms of neurogenic claudication over the last 2-3   years. He has an older history of right-sided foot drop. Imaging studies   shows severe stenosis at the L2-3, L3-4 levels along with lateral   recess stenosis at the L4-L5 region. He also has some stenosis at L1-  L2. He had failed maximal conservative management including   physical therapy, medications and epidural injections and given the   worsening symptoms, he was offered surgery for treatment. We   discussed the options at length and rather than pursuing an extensive   lumbar laminectomy from the L1 to L5 levels, we elected to try and   address the 2 most severe areas to see if this was sufficient and it was   hoped that if this was the case, then he would not need further   procedures, but if so, then we could limit it the L4-L5 level at a later   date. He consented to this approach after hearing the risks, benefits   and alternatives.  The risk would include, but were not limited to   bleeding, infection, nerve or spinal injury, need for further surgery   including CSF leak, instability requiring fusion, recurrent stenosis or   symptoms, lack of or incomplete complete benefit and need for potential further   procedures. Prior to the start of surgery, he received prophylactic   antibiotics and had bilateral lower extremity SCDs placed. The   antibiotics will be discontinued within 24 hours and the SCDs will be   continued while he is nonambulatory. DESCRIPTION OF PROCEDURE: The patient was positively identified   in the preoperative holding area and brought to the operating theater. After successful induction of anesthesia, he was placed prone on the   operating table and all pressure points were adequately padded. His   lumbar area was prepped and draped in the usual sterile fashion. Fluoroscopy was brought in to identify the levels and then counted up   from the sacrum to the L3-L4 segment. We then created an incision between the L2-3 and L3-4 disk spaces off   to the left side and penetrated this with local anesthetic. This was   opened with a scalpel and carried down to the soft tissues with   electrocautery. The muscle fascia was opened and a dilator was   passed down and docked on the lower hemilamina of L2. This was   swept down to the bottom of L3 and we progressively dilated up to an   18 mm size. An 18 x 4 cm port was placed over L3-L4 on the left side   and locked into final position and confirmed with fluoroscopy. The   operative microscope was brought in. The drill was used to create a   laminectomy and it was carried over to the contralateral side. The   ligament was opened and resected. He had severe ligamentous   hypertrophy that was carefully resected first on the contralateral side   until the traversing and exiting nerve root was decompressed. We then   returned back to the ipsilateral side and decompressed the lateral   recess. At the end of the decompression, the L3 and L4 nerve on both   sides could be palpated. There was no residual stenosis.      We then moved up to the L2-3 level, and an identical procedure was   performed in a similar fashion. The laminectomy was generated. The ligament was removed contralaterally, then ipsilaterally and   confirmation of all 4 nerve roots being decompressed was achieved. After both laminectomies were completed, hemostasis was obtained. The wound was irrigated with antibiotic irrigation, multiple times and 80   mg of Depo-Medrol was divided between the levels and about 5 mL of   plain Marcaine and Duramorph in a 50/50 mix was then placed in the   epidural space. The port was slowly withdrawn and the bleeders were   bipolared on the way out. The muscle fascia was closed with 2-0   Vicryl, subcutaneous tissues with 3-0 Vicryls and the skin was closed   with Dermabond and covered with a sterile dressing. The patient was then flipped back onto the [place], extubated and   taken to recovery room in stable condition. At the end of the procedure, all counts were correct. MD Art FONTANEZ / Radha.Hira   D:  11/28/2017   10:14   T:  11/28/2017   14:37   Job #:  [id number]

## 2017-11-28 NOTE — ANESTHESIA PREPROCEDURE EVALUATION
Anesthetic History   No history of anesthetic complications            Review of Systems / Medical History  Patient summary reviewed and pertinent labs reviewed    Pulmonary  Within defined limits                 Neuro/Psych   Within defined limits           Cardiovascular    Hypertension: well controlled              Exercise tolerance: >4 METS     GI/Hepatic/Renal  Within defined limits              Endo/Other        Arthritis     Other Findings            Physical Exam    Airway  Mallampati: II  TM Distance: 4 - 6 cm  Neck ROM: normal range of motion   Mouth opening: Normal     Cardiovascular    Rhythm: regular  Rate: normal         Dental    Dentition: Lower partial plate and Upper partial plate     Pulmonary  Breath sounds clear to auscultation               Abdominal         Other Findings            Anesthetic Plan    ASA: 2  Anesthesia type: general          Induction: Intravenous  Anesthetic plan and risks discussed with: Patient

## 2017-11-28 NOTE — IP AVS SNAPSHOT
[address] 
[phone] Patient: Zheng Lott MRN: [id number] :1935 About your hospitalization You were admitted on:  2017 You last received care in the:  [facility] 4M POST SURG ORT 1 You were discharged on:  2017 Why you were hospitalized Your primary diagnosis was:  Not on File Your diagnoses also included:  Lumbar Stenosis With Neurogenic Claudication Things You Need To Do (next 8 weeks) Follow up with Dinesh Kennedy MD  
  
Phone:  [phone] Where:  [address] [facility], [facility] [address] Follow up with Keya Bourne MD in 1 month(s)  
for follow-up Phone:  [phone] Where:  [address], [address] [facility] [address] Monday Dec 04, 2017 Follow up with Michell Gold M.D Appointment time is 9:10am   
  
Where:  [facility] [address] [place] [address] # [id number], [place], [address] [facility] Phone: [phone] Discharge Orders None A check abigail indicates which time of day the medication should be taken. My Medications TAKE these medications as instructed Instructions Each Dose to Equal  
 Morning Noon Evening Bedtime HYDROcodone-acetaminophen 5-325 mg per tablet Commonly known as:  Vaughn Taylorva Your last dose was: Your next dose is: Take 1 Tab by mouth every six (6) hours as needed. Max Daily Amount: 4 Tabs. 1 Tab  
    
   
   
   
  
 ibuprofen 600 mg tablet Commonly known as:  MOTRIN Your last dose was: Your next dose is: Take 1 Tab by mouth every six (6) hours as needed. 600 mg  
    
   
   
   
  
 lisinopril 10 mg tablet Commonly known as:  Michel Gayle Your last dose was: Your next dose is: Take 10 mg by mouth every morning.   
 10 mg  
    
   
   
   
  
 metoprolol tartrate 50 mg tablet Commonly known as:  LOPRESSOR Your last dose was: Your next dose is: Take 50 mg by mouth every morning. 50 mg  
    
   
   
   
  
 tamsulosin 0.4 mg capsule Commonly known as:  FLOMAX Your last dose was: Your next dose is: Take 0.4 mg by mouth every morning. Takes for 'toenail fungus' 0.4 mg Where to Get Your Medications Information on where to get these meds will be given to you by the nurse or doctor. ! Ask your nurse or doctor about these medications HYDROcodone-acetaminophen 5-325 mg per tablet  
 ibuprofen 600 mg tablet Discharge Instructions None Providers Seen During Your Hospitalization Provider Specialty Primary office phone Gregorio Tapia MD Neurosurgery [phone] Your Primary Care Physician (PCP) Primary Care Physician Office Phone Office Fax Don Ramos [phone] You are allergic to the following No active allergies Recent Documentation Height Weight BMI Smoking Status 1.73 m 80.6 kg 26.93 kg/m2 Never Smoker Emergency Contacts Name Discharge Info Relation Home Work Mobile Trina Rodriguez DISCHARGE CAREGIVER [3] Spouse [3] [phone] Patient Belongings The following personal items are in your possession at time of discharge: 
  Dental Appliances: Partials, Lowers, At home, Uppers  Visual Aid: None   Hearing Aids/Status: Does not own  Home Medications: None   Jewelry: Ring (2 rings given to wife)  Clothing: Pants, Undergarments, Footwear, Shirt, Socks (placed in PACU)    Other Valuables: None Discharge Instructions Attachments/References LUMBAR LAMINECTOMY: POST-OP (ENGLISH) CARE FOR AN INDWELLING URINARY CATHETER: GENERAL INFO (ENGLISH) Patient Handouts Lumbar Laminectomy: What to Expect at [facility] Your Recovery You can expect your back to feel stiff or sore after surgery. This should improve in the weeks after surgery. You may have trouble sitting or standing in one position for very long and may need pain medicine in the weeks after your surgery. Your doctor may advise you to work with a physical therapist to strengthen the muscles around your spine and trunk. You will need to learn how to lift, twist, and bend so that you do not put too much strain on your back. This care sheet gives you a general idea about how long it will take for you to recover. But each person recovers at a different pace. Follow the steps below to get better as quickly as possible. How can you care for yourself at home? Activity ? · Rest when you feel tired. Getting enough sleep will help you recover. ? · Try to walk each day. Start by walking a little more than you did the day before. Bit by bit, increase the amount you walk. Walking boosts blood flow and helps prevent pneumonia and constipation. Walking may also decrease your muscle soreness after surgery. ? · If advised by your doctor, you may need to avoid lifting anything that would cause excessive strain on your back. This may include a child, heavy grocery bags and milk containers, a heavy briefcase or backpack, cat litter or dog food bags, or a vacuum . ? · Avoid strenuous activities, such as bicycle riding, jogging, weight lifting, or aerobic exercise, until your doctor says it is okay. ? · Do not drive for 2 to 4 weeks after your surgery or until your doctor says it is okay. ? · Avoid riding in a car for more than 30 minutes at a time for 2 to 4 weeks after surgery. If you must ride in a car for a longer distance, stop often to walk and stretch your legs. ? · Try to change your position about every 30 minutes while sitting or standing. This will help decrease your back pain while you are healing. ? · You will probably need to take 4 to 6 weeks off from work. It depends on the type of work you do and how you feel. ? · You may have sex as soon as you feel able, but avoid positions that put stress on your back or cause pain. Diet ? · You can eat your normal diet. If your stomach is upset, try bland, low-fat foods like plain rice, broiled chicken, toast, and yogurt. ? · Drink plenty of fluids (unless your doctor tells you not to). ? · You may notice that your bowel movements are not regular right after your surgery. This is common. Try to avoid constipation and straining with bowel movements. You may want to take a fiber supplement every day. If you have not had a bowel movement after a couple of days, ask your doctor about taking a mild laxative. Medicines ? · Your doctor will tell you if and when you can restart your medicines. He or she will also give you instructions about taking any new medicines. ? · If you take blood thinners, such as warfarin (Coumadin), clopidogrel (Plavix), or aspirin, be sure to talk to your doctor. He or she will tell you if and when to start taking those medicines again. Make sure that you understand exactly what your doctor wants you to do. ? · Take pain medicines exactly as directed. ¨ If the doctor gave you a prescription medicine for pain, take it as prescribed. ¨ If you are not taking a prescription pain medicine, ask your doctor if you can take an over-the-counter medicine. ? · If your doctor prescribed antibiotics, take them as directed. Do not stop taking them just because you feel better. You need to take the full course of antibiotics. ? · If you think your pain medicine is making you sick to your stomach: 
¨ Take your medicine after meals (unless your doctor has told you not to). ¨ Ask your doctor for a different pain medicine. Incision care ? · Can remove bandage tomorrow, may shower and incision can get wet ? · No scrubbing over incision, no tub baths or hot tubs ? · Exercise ? · Do back exercises as instructed by your doctor. ? · Your doctor may advise you to work with a physical therapist to improve the strength and flexibility of your back. Other instructions ? · To reduce stiffness and help sore muscles, use a warm water bottle, a heating pad set on low, or a warm cloth on your back. Do not put heat right over the incision. Do not go to sleep with a heating pad on your skin. Follow-up care is a key part of your treatment and safety. Be sure to make and go to all appointments, and call your doctor if you are having problems. It's also a good idea to know your test results and keep a list of the medicines you take. When should you call for help? Call 911 anytime you think you may need emergency care. For example, call if: 
? · You passed out (lost consciousness). ? · You have sudden chest pain and shortness of breath, or you cough up blood. ? · You are unable to move a leg at all. ?Call your doctor now or seek immediate medical care if: 
? · You have new or worse symptoms in your legs or buttocks. Symptoms may include: ¨ Numbness or tingling. ¨ Weakness. ¨ Pain. ? · You lose bladder or bowel control. ? · You have loose stitches, or your incision comes open. ? · You have blood or fluid draining from the incision. ? · You have signs of infection, such as: 
¨ Increased pain, swelling, warmth, or redness. ¨ Pus draining from the incision. ¨ A fever. ¨ Red streaks leading from the incision. ? Watch closely for changes in your health, and be sure to contact your doctor if: 
? · You do not have a bowel movement after taking a laxative. ? · You are not getting better as expected. Where can you learn more? Go to http://letty-raisa.info/. Enter K007 in the search box to learn more about \"Lumbar Laminectomy: What to Expect at Home. \" Current as of: March 21, 2017 Content Version: 11.4 © 2906-9175 [employer]. Care instructions adapted under license by [employer] (which disclaims liability or warranty for this information). If you have questions about a medical condition or this instruction, always ask your healthcare professional. [address] any warranty or liability for your use of this information. Learning About How to Care for an Indwelling Urinary Catheter A urinary catheter is a flexible plastic tube used to drain urine from the bladder when a person cannot urinate. A doctor will place the catheter into the bladder by inserting it through the urethra. The urethra is the opening that carries urine from the bladder to the outside of the body. When the catheter is in the bladder, a small balloon is used to keep the catheter in place. The catheter lets urine drain from the bladder into a bag. The bag is usually attached to the thigh. Urinary catheters can be used in both men and women. A catheter that stays in place for a longer period of time is called an indwelling catheter. A catheter may be needed because of certain medical conditions. These include an enlarged prostate or problems controlling urine. It may be used after surgery on the pelvis or urinary tract. Urinary catheters are also used when the lower part of the body is paralyzed. When helping a loved one with a catheter, try to be relaxed. Caring for a catheter can be embarrassing for both of you. This may be especially true if you are caring for someone of the opposite sex. If you are calm and don't seem embarrassed, the person may feel more comfortable. How do you take care of the catheter? Wear disposable gloves when handling someone's catheter. Make sure to follow all of the instructions the doctor has given. And always wash your hands before and after you're done. Here are some other things to remember when caring for someone's catheter: · Make sure that urine is running out of the catheter into the urine collection bag. And make sure that the catheter tubing does not get twisted or bent. · Keep the urine collection bag below the level of the bladder. At night it may be helpful to hang the bag on the side of the bed. · Make sure that the urine collection bag does not drag and pull on the catheter. · It is okay to shower with a catheter and urine collection bag in place, unless the doctor says not to. · Check for swelling or signs of infection in the area around the catheter. Signs of infection include pus or irritated, swollen, red, or tender skin. · Clean the area around the catheter twice a day with water. Dry with a clean towel afterward. · Do not apply powder or lotion to the skin around the catheter. · Do not tug or pull on the catheter. · Sexual intercourse may still be possible for individuals who wear a catheter. It is best to talk with a doctor about options. How do you empty the bag? The urine collection bag needs to be emptied regularly. It is best to empty the bag when it's about half full or at bedtime. If the doctor has asked you to measure the amount of urine, do that before you empty the urine into the toilet. When you are ready to empty the bag, follow these steps: 1. Put on disposable gloves. 2. Remove the drain spout from its sleeve at the bottom of the collection bag. Open the valve on the spout. 3. Let the urine flow out of the bag and into the toilet or a container. Do not let the tubing or drain spout touch anything. 4. After you empty the bag, wipe off any liquid on the end of the drain spout. Close the valve and put the drain spout back into its sleeve. 5. Remove your gloves and throw them away. 6. Wash your hands with soap and water. How do you care for someone after the catheter is removed? After the catheter is taken out, the person may have trouble urinating. If this happens, try helping them sit in a few inches of warm water (sitz bath). If the urge to urinate comes during the sitz bath, it may be easier for them to urinate while still in the bath. Some burning may happen the first few times the person urinates. If the burning lasts longer, it may be a sign of an infection. If the catheter causes irritation or a rash, wearing loose, cotton underwear may help. Watch closely for changes in the person's health, and be sure to contact their doctor if you notice any problems. Where can you learn more? Go to http://letty-raisa.info/. Enter Y571 in the search box to learn more about \"Learning About How to Care for an Indwelling Urinary Catheter. \" Current as of: September 24, 2016 Content Version: 11.4 © 9189-1725 [employer]. Care instructions adapted under license by [employer] (which disclaims liability or warranty for this information). If you have questions about a medical condition or this instruction, always ask your healthcare professional. [address] any warranty or liability for your use of this information. Please provide this summary of care documentation to your next provider. Signatures-by signing, you are acknowledging that this After Visit Summary has been reviewed with you and you have received a copy. Patient Signature:  ____________________________________________________________ Date:  ____________________________________________________________  
  
Maria Fernanda Shelton Provider Signature:  ____________________________________________________________ Date:  ____________________________________________________________

## 2017-11-28 NOTE — BRIEF OP NOTE
BRIEF OPERATIVE NOTE    Date of Procedure: 11/28/2017   Preoperative Diagnosis: LUMBAR STENOSIS WITH CLAUDICATION   Postoperative Diagnosis: LUMBAR STENOSIS WITH CLAUDICATION     Procedure(s):  Minimally invasive L2-3 L3-4 LAMINECTOMY AND BILATERAL FORAMINOTOMIES  Surgeon(s) and Role:     * Alphonso Arreola MD - Primary         Assistant Staff:       Surgical Staff:  Circ-1: Radha Jorge RN  Circ-Relief: Anusha Graham RN  Scrub Tech-1: Jay Guadalupe  Scrub RN-Relief: April 468 Kiah Bradford RN  Surg Asst-1: Krishna Collins  Float Staff: April 468 Kiah Bradford RN; Anusha Graham RN  Radiology Technologist: Vaishali Dietrich RT, R  Event Time In   Incision Start 0809   Incision Close 1003     Anesthesia: General   Estimated Blood Loss: 25  Specimens: * No specimens in log *   Findings: ligamentous hypertrophy   Complications: none  Implants: * No implants in log *

## 2017-11-28 NOTE — ANESTHESIA POSTPROCEDURE EVALUATION
Post-Anesthesia Evaluation and Assessment    Patient: Lulu Haney MRN: [id number]  SSN: [id number]    YOB: 1935  Age: 81 y.o. Sex: male       Cardiovascular Function/Vital Signs  Visit Vitals    /60    Pulse 74    Temp 36.4 °C (97.6 °F)    Resp 15    SpO2 98%       Patient is status post general anesthesia for Procedure(s):  L2-3 L3-4 LAMINECTOMY AND BILATERAL FORAMINOTOMIES. Nausea/Vomiting: None    Postoperative hydration reviewed and adequate. Pain:  Pain Scale 1: Numeric (0 - 10) (11/28/17 1137)  Pain Intensity 1: 0 (11/28/17 1137)   Managed    Neurological Status:   Neuro (WDL): Within Defined Limits (11/28/17 1137)  Neuro  Neurologic State: Drowsy; Eyes open spontaneously (11/28/17 1016)  LUE Motor Response: Purposeful (11/28/17 1016)  LLE Motor Response: Purposeful (11/28/17 1016)  RUE Motor Response: Purposeful (11/28/17 1016)  RLE Motor Response: Purposeful (11/28/17 1016)   At baseline    Mental Status and Level of Consciousness: Arousable    Pulmonary Status:   O2 Device: Nasal cannula (11/28/17 1137)   Adequate oxygenation and airway patent    Complications related to anesthesia: None    Post-anesthesia assessment completed.  No concerns    Signed By: Sven Healy MD     November 28, 2017

## 2017-11-28 NOTE — IP AVS SNAPSHOT
[address] 
[phone] Patient: Lia Sauer MRN: [id number] :1935 My Medications TAKE these medications as instructed Instructions Each Dose to Equal  
 Morning Noon Evening Bedtime HYDROcodone-acetaminophen 5-325 mg per tablet Commonly known as:  Martha Fothergill Your last dose was: Your next dose is: Take 1 Tab by mouth every six (6) hours as needed. Max Daily Amount: 4 Tabs. 1 Tab  
    
   
   
   
  
 ibuprofen 600 mg tablet Commonly known as:  MOTRIN Your last dose was: Your next dose is: Take 1 Tab by mouth every six (6) hours as needed. 600 mg  
    
   
   
   
  
 lisinopril 10 mg tablet Commonly known as:  Merilynn [place] Your last dose was: Your next dose is: Take 10 mg by mouth every morning. 10 mg  
    
   
   
   
  
 metoprolol tartrate 50 mg tablet Commonly known as:  LOPRESSOR Your last dose was: Your next dose is: Take 50 mg by mouth every morning. 50 mg  
    
   
   
   
  
 tamsulosin 0.4 mg capsule Commonly known as:  FLOMAX Your last dose was: Your next dose is: Take 0.4 mg by mouth every morning. Takes for 'toenail fungus' 0.4 mg Where to Get Your Medications Information on where to get these meds will be given to you by the nurse or doctor. ! Ask your nurse or doctor about these medications HYDROcodone-acetaminophen 5-325 mg per tablet  
 ibuprofen 600 mg tablet

## 2017-11-29 VITALS
HEIGHT: 68 IN | RESPIRATION RATE: 16 BRPM | HEART RATE: 65 BPM | BODY MASS INDEX: 26.93 KG/M2 | SYSTOLIC BLOOD PRESSURE: 141 MMHG | WEIGHT: 177.69 LBS | TEMPERATURE: 97.5 F | OXYGEN SATURATION: 98 % | DIASTOLIC BLOOD PRESSURE: 79 MMHG

## 2017-11-29 LAB
APPEARANCE UR: CLEAR
BACTERIA URNS QL MICRO: NEGATIVE /HPF
BILIRUB UR QL: NEGATIVE
COLOR UR: ABNORMAL
EPITH CASTS URNS QL MICRO: ABNORMAL /LPF
GLUCOSE UR STRIP.AUTO-MCNC: 500 MG/DL
HGB UR QL STRIP: ABNORMAL
HYALINE CASTS URNS QL MICRO: ABNORMAL /LPF (ref 0–5)
KETONES UR QL STRIP.AUTO: NEGATIVE MG/DL
LEUKOCYTE ESTERASE UR QL STRIP.AUTO: ABNORMAL
NITRITE UR QL STRIP.AUTO: NEGATIVE
PH UR STRIP: 5.5 [PH] (ref 5–8)
PROT UR STRIP-MCNC: NEGATIVE MG/DL
RBC #/AREA URNS HPF: ABNORMAL /HPF (ref 0–5)
SP GR UR REFRACTOMETRY: 1.02 (ref 1–1.03)
UROBILINOGEN UR QL STRIP.AUTO: 0.2 EU/DL (ref 0.2–1)
WBC URNS QL MICRO: ABNORMAL /HPF (ref 0–4)

## 2017-11-29 PROCEDURE — G8987 SELF CARE CURRENT STATUS: HCPCS

## 2017-11-29 PROCEDURE — 97535 SELF CARE MNGMENT TRAINING: CPT

## 2017-11-29 PROCEDURE — 74011250637 HC RX REV CODE- 250/637: Performed by: NEUROLOGICAL SURGERY

## 2017-11-29 PROCEDURE — G8988 SELF CARE GOAL STATUS: HCPCS

## 2017-11-29 PROCEDURE — 77030034850

## 2017-11-29 PROCEDURE — 81001 URINALYSIS AUTO W/SCOPE: CPT | Performed by: NEUROLOGICAL SURGERY

## 2017-11-29 PROCEDURE — 97161 PT EVAL LOW COMPLEX 20 MIN: CPT

## 2017-11-29 PROCEDURE — 97165 OT EVAL LOW COMPLEX 30 MIN: CPT

## 2017-11-29 PROCEDURE — 77010033678 HC OXYGEN DAILY

## 2017-11-29 PROCEDURE — 97116 GAIT TRAINING THERAPY: CPT

## 2017-11-29 PROCEDURE — 74011250636 HC RX REV CODE- 250/636: Performed by: NEUROLOGICAL SURGERY

## 2017-11-29 PROCEDURE — G8989 SELF CARE D/C STATUS: HCPCS

## 2017-11-29 RX ORDER — HYDROCODONE BITARTRATE AND ACETAMINOPHEN 5; 325 MG/1; MG/1
1 TABLET ORAL
Qty: 30 TAB | Refills: 0 | Status: SHIPPED | OUTPATIENT
Start: 2017-11-29 | End: 2018-02-22

## 2017-11-29 RX ORDER — IBUPROFEN 600 MG/1
600 TABLET ORAL
Qty: 90 TAB | Refills: 1 | Status: ON HOLD | OUTPATIENT
Start: 2017-11-29 | End: 2018-02-23

## 2017-11-29 RX ADMIN — SODIUM CHLORIDE 125 ML/HR: 900 INJECTION, SOLUTION INTRAVENOUS at 04:42

## 2017-11-29 RX ADMIN — CEFAZOLIN 2 G: 1 INJECTION, POWDER, FOR SOLUTION INTRAMUSCULAR; INTRAVENOUS; PARENTERAL at 02:58

## 2017-11-29 RX ADMIN — DOCUSATE SODIUM 100 MG: 100 CAPSULE, LIQUID FILLED ORAL at 08:44

## 2017-11-29 RX ADMIN — DOCUSATE SODIUM AND SENNOSIDES 1 TABLET: 8.6; 5 TABLET, FILM COATED ORAL at 08:44

## 2017-11-29 RX ADMIN — ACETAMINOPHEN 650 MG: 325 TABLET ORAL at 06:29

## 2017-11-29 RX ADMIN — TAMSULOSIN HYDROCHLORIDE 0.4 MG: 0.4 CAPSULE ORAL at 08:44

## 2017-11-29 RX ADMIN — LISINOPRIL 10 MG: 20 TABLET ORAL at 08:44

## 2017-11-29 RX ADMIN — METOPROLOL TARTRATE 50 MG: 50 TABLET ORAL at 08:44

## 2017-11-29 NOTE — PROGRESS NOTES
Patient complaining of urge to void with no success. Bladder scan showed 740mL. Spoke with [facility]. New order to place paul catheter for retention.

## 2017-11-29 NOTE — PROGRESS NOTES
POD1 L2-4 lami  Cadet placed for retention o/n  Feels well otherwise    MLEW bilat  L foot drop feels better as well    A/p  neurostable  Leg bag, f/u with urology as outpt  Dc home when cleared by therapy

## 2017-11-29 NOTE — PROGRESS NOTES
Primary Nurse Romana Bone and Karlee Jackson, RN performed a dual skin assessment on this patient No impairment noted  Guzman score is 21

## 2017-11-29 NOTE — PROGRESS NOTES
physical Therapy EVALUATION/DISCHARGE  Patient: Tg Chopra (17 y.o. male)  Date: 11/29/2017  Primary Diagnosis: LUMBAR STENOSIS WITH CLAUDICATION   Lumbar stenosis with neurogenic claudication  Procedure(s) (LRB):  L2-3 L3-4 LAMINECTOMY AND BILATERAL FORAMINOTOMIES (N/A) 1 Day Post-Op   Precautions:   Back, Fall  ASSESSMENT :  Based on the objective data described below, the patient presents with ability to complete basic ADL tasks and mobility at supervision-Mod I level following education on back precautions and compensatory techniques. Nursing cleared for therapy. Patient received up in chair with brace donned. Denies pain and motivated for PT session. Patient lives at home with wife and reports indep with mobility tasks and transfers, still works owning small business and driving. He reports the numbness in BLE prior to surgery has cleared. Also reported R foot foot drop prior to surgery which seems to have cleared up and demonstrating 4+/5 ankle DF strength bilaterally. Noticed slight foot drop when completing stairs with poor deceleration ascending and descending. Improved performance on stairs with verbal cues. Ambulated 350ft without physical assist, buckling or c/o pain. Reviewed importanc eof maintaining neutral spine with mobility and pt verbalized understanding. Wife present and supportive. All questions answered. Further skilled acute physical therapy is not indicated at this time. Pt is cleared for discharge. Skilled physical therapy is not indicated at this time. PLAN :  Discharge Recommendations: None  Further Equipment Recommendations for Discharge: None     SUBJECTIVE:   Patient stated I am ready to get home.     OBJECTIVE DATA SUMMARY:   HISTORY:    Past Medical History:   Diagnosis Date    Hypertension     PUD (peptic ulcer disease)     Pt states he had a 'peptic ulcer' in his early 25s     Past Surgical History:   Procedure Laterality Date    HX CATARACT REMOVAL Bilateral 2015  HX HERNIA REPAIR Right 2015    Right inguinal hernia repair    HX KNEE REPLACEMENT Right 2016    HX KNEE REPLACEMENT Left 2016     Prior Level of Function/Home Situation: Independent. Lives with wife. Works full time  Personal factors and/or comorbidities impacting plan of care: HTN    Home Situation  Home Environment: Private residence  # Steps to Enter: 3  Rails to Enter: Yes  One/Two Story Residence: One story  Living Alone: No  Support Systems: Spouse/Significant Other/Partner  Patient Expects to be Discharged to[de-identified] Private residence  Current DME Used/Available at Home: Dina Bacca, straight, Raised toilet seat, Walker, rolling, Shower chair  Tub or Shower Type: Shower    EXAMINATION/PRESENTATION/DECISION MAKING:   Critical Behavior:  Neurologic State: Alert  Orientation Level: Oriented X4        Hearing: Auditory  Auditory Impairment: None  Hearing Aids/Status: Does not own    Range Of Motion:  AROM: Within functional limits                       Strength:    Strength:  Within functional limits                    Tone & Sensation:   Tone: Abnormal              Sensation: Intact               Coordination:  Coordination: Within functional limits  Vision:   Acuity: Within Defined Limits  Functional Mobility:  Bed Mobility:  Rolling:  (received up in chair, verbal ed on log roll)           Transfers:  Sit to Stand: Modified independent  Stand to Sit: Modified independent        Bed to Chair: Supervision              Balance:   Sitting: Intact  Standing: Intact  Ambulation/Gait Training:              Gait Description (WDL): Within defined limits                                         Functional Measure:  Barthel Index:    Bathin  Bladder: 10  Bowels: 10  Groomin  Dressin  Feeding: 10  Mobility: 10  Stairs: 5  Toilet Use: 10  Transfer (Bed to Chair and Back): 10  Total: 75       Barthel and G-code impairment scale:  Percentage of impairment CH  0% CI  1-19% CJ  20-39% CK  40-59% CL  60-79% CM  80-99% CN  100%   Barthel Score 0-100 100 99-80 79-60 59-40 20-39 1-19   0   Barthel Score 0-20 20 17-19 13-16 9-12 5-8 1-4 0      The Barthel ADL Index: Guidelines  1. The index should be used as a record of what a patient does, not as a record of what a patient could do. 2. The main aim is to establish degree of independence from any help, physical or verbal, however minor and for whatever reason. 3. The need for supervision renders the patient not independent. 4. A patient's performance should be established using the best available evidence. Asking the patient, friends/relatives and nurses are the usual sources, but direct observation and common sense are also important. However direct testing is not needed. 5. Usually the patient's performance over the preceding 24-48 hours is important, but occasionally longer periods will be relevant. 6. Middle categories imply that the patient supplies over 50 per cent of the effort. 7. Use of aids to be independent is allowed. Lito Gleason., Barthel, D.W. ([id number]). Functional evaluation: the Barthel Index. [address] [facility] (14)2. Taylor Dawson joseph NITO Oakes, Surinder Ricketts., Ariadne Woosd., [place], [address] [place] [address] (1999). Measuring the change indisability after inpatient rehabilitation; comparison of the responsiveness of the Barthel Index and Functional [place] Measure. Journal of Neurology, Neurosurgery, and Psychiatry, 66(4), 367-063. Pedro Chairez, N.J.A, MARQUIS Sheehan, & Ashley Phillips MDEEPALI. (2004.) Assessment of post-stroke quality of life in cost-effectiveness studies: The usefulness of the Barthel Index and the EuroQoL-5D. Quality of Life Research, 13, 905-99         G codes: In compliance with CMSs Claims Based Outcome Reporting, the following G-code set was chosen for this patient based on their primary functional limitation being treated:     The outcome measure chosen to determine the severity of the functional limitation was the Barthel with a score of 75/100 which was correlated with the impairment scale. ? Mobility - Walking and Moving Around:     - CURRENT STATUS: CJ - 20%-39% impaired, limited or restricted    - GOAL STATUS: CJ - 20%-39% impaired, limited or restricted    - D/C STATUS:  CJ - 20%-39% impaired, limited or restricted          Physical Therapy Evaluation Charge Determination   History Examination Presentation Decision-Making   MEDIUM  Complexity : 1-2 comorbidities / personal factors will impact the outcome/ POC  MEDIUM Complexity : 3 Standardized tests and measures addressing body structure, function, activity limitation and / or participation in recreation  LOW Complexity : Stable, uncomplicated  LOW Complexity : FOTO score of       Based on the above components, the patient evaluation is determined to be of the following complexity level: LOW     Pain:Pain Scale 1: Numeric (0 - 10)  Pain Intensity 1: 0     Activity Tolerance:   Good  Please refer to the flowsheet for vital signs taken during this treatment. After treatment:   [x]   Patient left in no apparent distress sitting up in chair  []   Patient left in no apparent distress in bed  [x]   Call bell left within reach  [x]   Nursing notified  [x]   Caregiver present  []   Bed alarm activated    COMMUNICATION/EDUCATION:   Communication/Collaboration:  [x]   Fall prevention education was provided and the patient/caregiver indicated understanding. [x]   Patient/family have participated as able and agree with findings and recommendations. []   Patient is unable to participate in plan of care at this time.   Findings and recommendations were discussed with: Occupational Therapist and Registered Nurse    Thank you for this referral.  Hallie Padilla, PT   Time Calculation: 23 mins

## 2017-11-29 NOTE — PROGRESS NOTES
11/29/2017 10:47 AM EMR reviewed, no discharge needs from PT or OT indicated. Met with pt and pt's wife. Charted address and phone number confirmed. Pt will have his wife at home after discharge to assist. Discussed with Ortho NP, pt needs Urology follow up for voiding trial. Spoke with pt who reported he has made an appt with Dr. Jennyfer Hernandez at [facility] for 12/4. CM called and confirmed this appt and it is for a possible voiding trial. Pt has rx coverage and fills his scripts at [facility]. Pt's wife will transport pt home. No discharge needs identified. CM will follow. YAMIL Palma  Care Management Interventions  PCP Verified by CM:  Yes Sheree Moe  Physical Therapy Consult: Yes  Occupational Therapy Consult: Yes  Current Support Network: Lives with Spouse, Own Home

## 2017-11-29 NOTE — PROGRESS NOTES
Bedside and Verbal shift change report given to Felipa RN (oncoming nurse) by Kali Eldridge RN (offgoing nurse). Report included the following information SBAR, Kardex, Procedure Summary, Intake/Output, MAR, Accordion and Recent Results.

## 2017-11-29 NOTE — PROGRESS NOTES
Occupational Therapy EVALUATION/discharge  Patient: Gretchen Corona (66 y.o. male)  Date: 11/29/2017  Primary Diagnosis: LUMBAR STENOSIS WITH CLAUDICATION   Lumbar stenosis with neurogenic claudication  Procedure(s) (LRB):  L2-3 L3-4 LAMINECTOMY AND BILATERAL FORAMINOTOMIES (N/A) 1 Day Post-Op   Precautions: Back       ASSESSMENT:   Based on the objective data described below, the patient presents with ability to complete basic ADL tasks at supervision following education on back precautions and compensatory techniques. Nursing cleared for therapy. Patient received up in chair. Denies pain and motivated for OT session. Patient lives at home with wife and reports indep with ADL tasks and transfers, still works owning small business and driving. He reports the numbness in BLE prior to surgery has cleared. Wife present and supportive. All questions answered and issued hand out for back precautions during ADL tasks and home safety ed. Further skilled acute occupational therapy is not indicated at this time. Discharge Recommendations: None  Further Equipment Recommendations for Discharge: reacher- owns     SUBJECTIVE:   Patient stated Its amazing the pain is gone.     OBJECTIVE DATA SUMMARY:   HISTORY:   Past Medical History:   Diagnosis Date    Hypertension     PUD (peptic ulcer disease)     Pt states he had a 'peptic ulcer' in his early 25s     Past Surgical History:   Procedure Laterality Date    HX CATARACT REMOVAL Bilateral 2015    HX HERNIA REPAIR Right 02/2015    Right inguinal hernia repair    HX KNEE REPLACEMENT Right 11/2016    HX KNEE REPLACEMENT Left 07/2016       Prior Level of Function/Environment/Context: Home with wife. Indep with ADL tasks. Owns small business and still works.    Expanded or extensive additional review of patient history: patient report hx of ETOH sober x 30 years, B TKR    Home Situation  Home Environment: Private residence  # Steps to Enter: 3  One/Two Story Residence: One story  Living Alone: No  Support Systems: Family member(s)  Patient Expects to be Discharged to[de-identified] Private residence  Current DME Used/Available at Home: Grab bars, Cane, quad, Raised toilet seat, Walker, rolling  Tub or Shower Type: Shower  [x]  Right hand dominant   []  Left hand dominant    EXAMINATION OF PERFORMANCE DEFICITS:  Cognitive/Behavioral Status:  Neurologic State: Alert          Skin: uppers visible intact    Edema: uppers none    Hearing: Auditory  Auditory Impairment: None  Hearing Aids/Status: Does not own    Vision/Perceptual:             Acuity: Within Defined Limits         Range of Motion:  BUE: WDL                      Strength:  BUE WDL       Coordination:     Fine Motor Skills-Upper: Left Intact; Right Intact    Gross Motor Skills-Upper: Left Intact; Right Intact    Tone & Sensation:  Tone: BUE normal  Sensation: denies numbness or tingling in BLE or BUE        Balance:  Sitting: Intact  Standing: Intact    Functional Mobility and Transfers for ADLs:  Bed Mobility:  Rolling:  (received up in chair, verbal ed on log roll)    Transfers:  Sit to Stand: Supervision  Stand to Sit: Supervision  Bed to Chair: Supervision  Toilet Transfer : Supervision    ADL Assessment:  Feeding: Independent  Oral Facial Hygiene/Grooming: Supervision  Bathing: Supervision  Upper Body Dressing: Modified independent  Lower Body Dressing: Supervision  Toileting: Supervision                ADL Intervention and task modifications:     Patient instructed and indicated understanding the benefits of maintaining activity tolerance, functional mobility, and independence with self care tasks during acute stay  to ensure safe return home and to baseline. Encouraged patient to increase frequency and duration OOB, not sitting longer than 30 mins without marching/walking with staff, be out of bed for all meals, perform daily ADLs (as approved by RN/MD regarding bathing etc), and performing functional mobility to/from bathroom. Patient instruction and indicated understanding on body mechanics, ergonomics and gravitational force on the spine during different body positions to plan activities in prep for return home to complete basic ADLs, instrumental ADLs and back to work safely. Bathing: Patient instructed and indicated understanding when bathing to not submerge wound in water,follow dc instructions for returning to shower. Dressing brace: Patient instructed and demonstrated to don/doff velcro on brace using dominant side, keeping non-dominant side intact. Patient instructed and demonstrated in meantime of being able to stand with back against wall to don/doff brace, to don/doff seated using lap and bed/chair surface to support brace while manipulating. Dressing lower body: Patient instructed to don brace first and on the benefits to remain seated to don all clothing to increase independence with precautions and pain management. Patient instructed and demonstrated tailor sitting for lower body dressing. Ed on sock aide and reacher. Patient owns reacher, not interested in sock aide as wife will help as needed. Toileting: Patient instructed on not twisting with toileing hygiene. Has BSC over toilet. Home safety: Patient instructed and indicated understanding on home modifications and safety [raise height of ADL objects (i.e. clothing, sink items, fridge items, items to mouth when grooming), appropriate height of chair surfaces, recliner safety, change of floor surfaces, clear pathways] to increase independence and fall prevention. Standing: Patient instructed and indicated understanding to walk up to sink/counter top/surfaces, step into walker, square off while using objects, slide objects along surfaces, to increase adherence to back precautions and fall prevention. Patient instructed to increase amount of time standing in order to increase independence and tolerance with ADLs.  During prolonged standing, can open cabinet door or place foot on stool to decrease spinal pressure/increase pain. Lower Body Dressing Assistance  Socks: Supervision/set-up; Compensatory technique training  Slip on Shoes with Back: Minimum assistance; Compensatory technique training (wife assisting to don over slipper socks)  Leg Crossed Method Used: Yes      Functional Measure:  Barthel Index:    Bathin  Bladder: 10  Bowels: 10  Groomin  Dressin  Feeding: 10  Mobility: 10  Stairs: 5  Toilet Use: 10  Transfer (Bed to Chair and Back): 10  Total: 75       Barthel and G-code impairment scale:  Percentage of impairment CH  0% CI  1-19% CJ  20-39% CK  40-59% CL  60-79% CM  80-99% CN  100%   Barthel Score 0-100 100 99-80 79-60 59-40 20-39 1-19   0   Barthel Score 0-20 20 17-19 13-16 9-12 5-8 1-4 0      The Barthel ADL Index: Guidelines  1. The index should be used as a record of what a patient does, not as a record of what a patient could do. 2. The main aim is to establish degree of independence from any help, physical or verbal, however minor and for whatever reason. 3. The need for supervision renders the patient not independent. 4. A patient's performance should be established using the best available evidence. Asking the patient, friends/relatives and nurses are the usual sources, but direct observation and common sense are also important. However direct testing is not needed. 5. Usually the patient's performance over the preceding 24-48 hours is important, but occasionally longer periods will be relevant. 6. Middle categories imply that the patient supplies over 50 per cent of the effort. 7. Use of aids to be independent is allowed. Mauro Baum., Barthel, D.W. ([id number]). Functional evaluation: the Barthel Index. [address] [facility] (14)2. Eitan Damon joseph NITO Oakes, Suzanne Deras, Leandro Vincent., Claudy Nichole, [address] [place] [address] ().  Measuring the change indisability after inpatient rehabilitation; comparison of the responsiveness of the Barthel Index and Functional [place] Measure. Journal of Neurology, Neurosurgery, and Psychiatry, 664), 306-455. LUZ Alfonso, MARQUIS Sheehan, & Krystin Cardoso M.A. (2004.) Assessment of post-stroke quality of life in cost-effectiveness studies: The usefulness of the Barthel Index and the EuroQoL-5D. Quality of Life Research, 13, 919-97       G codes: In compliance with CMSs Claims Based Outcome Reporting, the following G-code set was chosen for this patient based on their primary functional limitation being treated: The outcome measure chosen to determine the severity of the functional limitation was the Barthel Index with a score of 75/100 which was correlated with the impairment scale. ? Self Care:     - CURRENT STATUS: CJ - 20%-39% impaired, limited or restricted    - GOAL STATUS: CJ - 20%-39% impaired, limited or restricted    - D/C STATUS:  CJ - 20%-39% impaired, limited or restricted       Occupational Therapy Evaluation Charge Determination   History Examination Decision-Making   LOW Complexity : Brief history review  LOW Complexity : 1-3 performance deficits relating to physical, cognitive , or psychosocial skils that result in activity limitations and / or participation restrictions  LOW Complexity : No comorbidities that affect functional and no verbal or physical assistance needed to complete eval tasks       Based on the above components, the patient evaluation is determined to be of the following complexity level: LOW   Pain:Pain Scale 1: Numeric (0 - 10)  Pain Intensity 1: 0           Activity Tolerance:   Good for basic ADL education and participation. Denies any dizziness or lightheadedness.      After treatment:   [x]  Patient left in no apparent distress sitting up in chair  []  Patient left in no apparent distress in bed  [x]  Call bell left within reach  [x]  Nursing notified  [x]  Caregiver present  []  Bed alarm activated    COMMUNICATION/EDUCATION: Communication/Collaboration:  [x]      Home safety education was provided and the patient/caregiver indicated understanding. [x]      Patient/family have participated as able and agree with findings and recommendations. []      Patient is unable to participate in plan of care at this time.   Findings and recommendations were discussed with: Physical Therapist, Registered Nurse and Pateint    Chana Garza OT  Time Calculation: 17 mins

## 2017-11-29 NOTE — PROGRESS NOTES
I have reviewed discharge instructions with the patient and spouse. The patient and spouse verbalized understanding. Discharge medications reviewed with patient and spouse and appropriate educational materials and side effects teaching were provided. RX given to pt. Signed copy of d/c instructions placed in pt's chart.

## 2018-02-21 PROCEDURE — 36415 COLL VENOUS BLD VENIPUNCTURE: CPT | Performed by: EMERGENCY MEDICINE

## 2018-02-22 ENCOUNTER — HOSPITAL ENCOUNTER (INPATIENT)
Age: 83
LOS: 1 days | Discharge: HOME | DRG: 282 | End: 2018-02-24
Attending: EMERGENCY MEDICINE | Admitting: SPECIALIST
Payer: MEDICARE

## 2018-02-22 DIAGNOSIS — I21.4 NSTEMI (NON-ST ELEVATED MYOCARDIAL INFARCTION) (HCC): Primary | ICD-10-CM

## 2018-02-22 PROCEDURE — 93005 ELECTROCARDIOGRAM TRACING: CPT

## 2018-02-22 PROCEDURE — 94761 N-INVAS EAR/PLS OXIMETRY MLT: CPT

## 2018-02-22 PROCEDURE — 99285 EMERGENCY DEPT VISIT HI MDM: CPT

## 2018-02-22 PROCEDURE — 84484 ASSAY OF TROPONIN QUANT: CPT | Performed by: EMERGENCY MEDICINE

## 2018-02-22 PROCEDURE — 96365 THER/PROPH/DIAG IV INF INIT: CPT

## 2018-02-22 PROCEDURE — 85025 COMPLETE CBC W/AUTO DIFF WBC: CPT | Performed by: EMERGENCY MEDICINE

## 2018-02-22 PROCEDURE — 80053 COMPREHEN METABOLIC PANEL: CPT | Performed by: EMERGENCY MEDICINE

## 2018-02-22 PROCEDURE — 36415 COLL VENOUS BLD VENIPUNCTURE: CPT | Performed by: EMERGENCY MEDICINE

## 2018-02-22 RX ORDER — MORPHINE SULFATE 2 MG/ML
6 INJECTION, SOLUTION INTRAMUSCULAR; INTRAVENOUS
Status: COMPLETED | OUTPATIENT
Start: 2018-02-22 | End: 2018-02-23

## 2018-02-22 RX ORDER — SIMETHICONE 125 MG
125 CAPSULE ORAL
COMMUNITY

## 2018-02-22 RX ORDER — GUAIFENESIN 100 MG/5ML
162 LIQUID (ML) ORAL
Status: COMPLETED | OUTPATIENT
Start: 2018-02-22 | End: 2018-02-23

## 2018-02-22 RX ORDER — ONDANSETRON 2 MG/ML
4 INJECTION INTRAMUSCULAR; INTRAVENOUS
Status: COMPLETED | OUTPATIENT
Start: 2018-02-22 | End: 2018-02-23

## 2018-02-22 NOTE — IP AVS SNAPSHOT
[address] [id number] 
[phone] Patient: Ke Gerardo MRN: [id number] :1935 About your hospitalization You were admitted on:  2018 You last received care in the:  [facility] You were discharged on:  2018 Why you were hospitalized Your primary diagnosis was:  Not on File Your diagnoses also included:  Nstemi (Non-St Elevated Myocardial Infarction) (Hcc) Follow-up Information Follow up With Details Comments Contact Info Terrell Barrios MD On 3/2/2018 9:30am [address] 
[phone] Christina Quiroz MD   [address] 
[phone] Discharge Orders None A check abigail indicates which time of day the medication should be taken. My Medications START taking these medications Instructions Each Dose to Equal  
 Morning Noon Evening Bedtime  
 apixaban 5 mg tablet Commonly known as:  Lazaro Means Your last dose was: Your next dose is: Take 1 Tab by mouth two (2) times a day. 5 mg  
    
   
   
   
  
 aspirin 81 mg chewable tablet Your last dose was: Your next dose is: Take 1 Tab by mouth daily. 81 mg  
    
   
   
   
  
 atorvastatin 40 mg tablet Commonly known as:  LIPITOR Your last dose was: Your next dose is: Take 1 Tab by mouth daily. 40 mg CONTINUE taking these medications Instructions Each Dose to Equal  
 Morning Noon Evening Bedtime ADVIL 200 mg tablet Generic drug:  ibuprofen Your last dose was: Your next dose is: Take 200-400 mg by mouth every eight (8) hours as needed for Pain. 200-400 mg  
    
   
   
   
  
 Gas-X 125 mg capsule Generic drug:  simethicone Your last dose was: Your next dose is: Take 125 mg by mouth four (4) times daily as needed for Flatulence. 125 mg  
    
   
   
   
  
 tamsulosin 0.4 mg capsule Commonly known as:  FLOMAX Your last dose was: Your next dose is: Take 0.4 mg by mouth every morning. 0.4 mg  
    
   
   
   
  
  
STOP taking these medications   
 lisinopril 10 mg tablet Commonly known as:  PRINIVIL, ZESTRIL  
   
  
 metoprolol tartrate 50 mg tablet Commonly known as:  LOPRESSOR Where to Get Your Medications Information on where to get these meds will be given to you by the nurse or doctor. ! Ask your nurse or doctor about these medications  
  apixaban 5 mg tablet  
 atorvastatin 40 mg tablet Discharge Instructions Discharge Instructions:  
 
Medications: Activity: As tolerated except do not lift over 10 pounds for 5 days. Diet:  
 
American Heart Association. Follow-up:  
 
 
Follow up with Brannon Gray MD on March 2nd. 2018 at 9:30am. 
[facility] 
[phone] If you smoke, STOP! Heart Attack: Care Instructions Your Care Instructions A heart attack (myocardial infarction, or MI) occurs when one or more of the coronary arteries, which supply the heart with oxygen-rich blood, is blocked. A blockage usually occurs when plaque inside the artery breaks open and a blood clot forms in the artery. After a heart attack, you may be worried about your future. Over the next several weeks, your heart will start to heal. Though it can be hard to break old habits, you can prevent another heart attack by making some lifestyle changes and by taking medicines. You may use this information for ideas about what to do at home to speed your recovery. Follow-up care is a key part of your treatment and safety.  Be sure to make and go to all appointments, and call your doctor if you are having problems. It's also a good idea to know your test results and keep a list of the medicines you take. How can you care for yourself at home? Activity ? · Until your doctor says it is okay, do not do strenuous exercise. And do not lift, pull, or push anything heavy. Ask your doctor what types of activities are safe for you. ? · If your doctor has not set you up with a cardiac rehabilitation (rehab) program, talk to him or her about whether that is right for you. Cardiac rehab includes supervised exercise. It also includes help with diet and lifestyle changes and emotional support. It may reduce your risk of future heart problems. ? · Increase your activities slowly. Take short rest breaks when you get tired. ? · If your doctor recommends it, get more exercise. Walking is a good choice. Bit by bit, increase the amount you walk every day. Try for at least 30 minutes on most days of the week. You also may want to swim, bike, or do other activities. Talk with your doctor before you start an exercise program to make sure it is safe for you. ? · Ask your doctor when you can drive, go back to work, and do other daily activities again. ? · You can have sex as soon as you feel ready for it. Often this means when you can easily walk around or climb stairs. Talk with your doctor if you have any concerns. If you are taking nitroglycerin, do not take erection-enhancing medicine such as sildenafil (Viagra), tadalafil (Cialis), or vardenafil (Levitra) . ? Lifestyle changes ? · Do not smoke. Smoking increases your risk of another heart attack. If you need help quitting, talk to your doctor about stop-smoking programs and medicines. These can increase your chances of quitting for good. ? · Eat a heart-healthy diet that is low in saturated fat and salt, and is full of fruits, vegetables and whole-grains. Eat at least two servings of fish each week. You may get more details about how to eat healthy.  But these tips can help you get started. ? · Stay at a healthy weight, or lose weight if you need to. Medicines ? · Be safe with medicines. Take your medicines exactly as prescribed. Call your doctor if you think you are having a problem with your medicine. You will get more details on the specific medicines your doctor prescribes. Do not stop taking your medicine unless your doctor tells you to. Not taking your medicine might raise your risk of having another heart attack. ? · You may need several medicines to help lower your risk of another heart attack. These include: ¨ Blood pressure medicines such as angiotensin-converting enzyme (ACE) inhibitors, ARBs (angiotensin II receptor blockers), and beta-blockers. ¨ Cholesterol medicine called statins. ¨ Aspirin and other blood thinners. These prevent blood clots that can cause a heart attack. ? · If your doctor has given you nitroglycerin, keep it with you at all times. If you have angina symptoms, such as chest pain or pressure, sit down and rest. Take the first dose of nitroglycerin as directed. If symptoms get worse or are not getting better within 5 minutes, call 911 right away. Stay on the phone. The emergency  will tell you what to do. ? · Do not take any over-the-counter medicines, vitamins, or herbal products without talking to your doctor first. ?Staying healthy ? · Manage other health conditions such as high blood pressure and diabetes. ? · Avoid colds and flu. Get a pneumococcal vaccine shot. If you have had one before, ask your doctor whether you need another dose. Get the flu vaccine every year. If you must be around people with colds or flu, wash your hands often. ? · Be sure to tell your doctor about any angina symptoms you have had, even if they went away. Pay attention to your angina symptoms. Know what is typical for you and learn how to control it. Know when to call for help. ? · Talk to your family, friends, or a counselor about your feelings. It is normal to feel frightened, angry, hopeless, helpless, and even guilty. Talking openly about bad feelings can help you cope. If you have symptoms of depression, talk to your doctor. When should you call for help? Call 911 anytime you think you may need emergency care. For example, call if: 
? · You have symptoms of a heart attack. These may include: ¨ Chest pain or pressure, or a strange feeling in the chest. 
¨ Sweating. ¨ Shortness of breath. ¨ Nausea or vomiting. ¨ Pain, pressure, or a strange feeling in the back, neck, jaw, or upper belly or in one or both shoulders or arms. ¨ Lightheadedness or sudden weakness. ¨ A fast or irregular heartbeat. After you call 911, the  may tell you to chew 1 adult-strength or 2 to 4 low-dose aspirin. Wait for an ambulance. Do not try to drive yourself. ? · You have angina symptoms (such as chest pain or pressure) that do not go away with rest or are not getting better within 5 minutes after you take a dose of nitroglycerin. ? · You passed out (lost consciousness). ? · You feel like you are having another heart attack. ?Call your doctor now or seek immediate medical care if: 
? · You are having angina symptoms, such as chest pain or pressure, more often than usual, or the symptoms are different or worse than usual.  
? · You have new or increased shortness of breath. ? · You are dizzy or lightheaded, or you feel like you may faint. ? Watch closely for changes in your health, and be sure to contact your doctor if you have any problems. Where can you learn more? Go to http://letty-raisa.info/. Enter [id number] in the search box to learn more about \"Heart Attack: Care Instructions. \" Current as of: September 21, 2016 Content Version: 11.4 © 5367-8709 [employer].  Care instructions adapted under license by Mirella Rowe (which disclaims liability or warranty for this information). If you have questions about a medical condition or this instruction, always ask your healthcare professional. [address] any warranty or liability for your use of this information. SpinGo Announcement We are excited to announce that we are making your provider's discharge notes available to you in SpinGo. You will see these notes when they are completed and signed by the physician that discharged you from your recent hospital stay. If you have any questions or concerns about any information you see in SpinGo, please call the Health Information Department where you were seen or reach out to your Primary Care Provider for more information about your plan of care. Introducing \[facility]\"" & HEALTH SERVICES! Dear Zaire Stockton: 
Thank you for requesting a SpinGo account. Our records indicate that you have previously registered for a SpinGo account but its currently inactive. Please call our SpinGo support line at [phone]. Additional Information If you have questions, please visit the Frequently Asked Questions section of the SpinGo website at [email or web address] Remember, SpinGo is NOT to be used for urgent needs. For medical emergencies, dial 911. Now available from your iPhone and Android! Providers Seen During Your Hospitalization Provider Specialty Primary office phone Felton Harding MD Emergency Medicine [phone] Ozzie Parisi MD Cardiology [phone] Your Primary Care Physician (PCP) Primary Care Physician Office Phone Office Fax Andreia Medley [phone] You are allergic to the following No active allergies Recent Documentation Height Weight BMI Smoking Status 1.702 m 78 kg 26.93 kg/m2 Never Smoker Emergency Contacts Name Discharge Info Relation Home Work Mobile Trina Rodriguez DISCHARGE CAREGIVER [3] Spouse [3] [phone] Patient Belongings The following personal items are in your possession at time of discharge: 
  Dental Appliances: None  Visual Aid: Glasses, At bedside      Home Medications: None   Jewelry: None  Clothing: Footwear, Shirt, Pants, Undergarments, Socks    Other Valuables: None Discharge Instructions Attachments/References MEFS - APIXABAN (ELIQUIS) - (BY MOUTH) (ENGLISH) MEFS - ATORVASTATIN (LIPITOR) - (BY MOUTH) (ENGLISH) ASPIRIN: HEART ATTACK AND STROKE PREVENTION (ENGLISH) Patient Handouts Apixaban (Eliquis) - (By mouth) Why this medicine is used:  
Treats and prevents blood clots. Contact a nurse or doctor right away if you have: 
· Sudden or severe headache · Back pain, numbness, tingling, weakness in your legs or feet · Loss of bladder or bowel control · Bloody vomit or vomit that looks like coffee grounds; bloody or black, tarry stools · Bleeding that does not stop or bruises that do not heal  
 
Common side effects: · Minor bleeding or bruising © 2017 [employer] Information is for End User's use only and may not be sold, redistributed or otherwise used for commercial purposes. Atorvastatin (Lipitor) - (By mouth) Why this medicine is used:  
Treats high cholesterol and triglyceride levels. Reduces the risk of angina, stroke, heart attack, or certain heart and blood vessel problems. Contact a nurse or doctor right away if you have: · Severe headache, confusion, trouble speaking · Dark urine or pale stools · Yellow skin or eyes · Nausea, vomiting, loss of appetite, stomach pain · Muscle pain, tenderness, or weakness; unusual tiredness Common side effects: 
· Diarrhea · Joint pain © 2017 [employer] Information is for End User's use only and may not be sold, redistributed or otherwise used for commercial purposes. Taking Aspirin to Prevent Heart Attack and Stroke: Care Instructions Your Care Instructions Aspirin acts as a \"blood thinner. \" It prevents blood clots from forming. When taken during and after a heart attack, it can reduce your chance of dying. And it's used if you have a stent in your coronary artery. Also, aspirin helps certain people lower their risk of a heart attack or stroke. Be sure you know what dose of aspirin to take and how often to take it. Low-dose aspirin is typically 81 mg. But the dose for daily aspirin can range from 81 mg to 325 mg. Taking aspirin every day can cause bleeding. It may not be safe if you have stomach ulcers. And it may not be safe if you have high blood pressure that is not controlled. If you take aspirin pills every day, do not take ones that have other ingredients such as caffeine or sodium. Before you start to take aspirin, tell your doctor all the medicines, vitamins, herbal products, and supplements you take. Follow-up care is a key part of your treatment and safety. Be sure to make and go to all appointments, and call your doctor if you are having problems. It's also a good idea to know your test results and keep a list of the medicines you take. How can you care for yourself at home? · Take aspirin with a full glass of water unless your doctor tells you not to. Do not lie down right after you take it. · If you have a stent in your coronary artery, take your aspirin as your heart doctor says to. If another doctor says to stop taking the aspirin for any reason, talk to your heart doctor before you stop. · Do not chew or crush the coated or sustained-release forms of aspirin. · Ask your doctor if you can drink alcohol while you take aspirin. And ask how much you can drink. Too much alcohol with aspirin can cause stomach bleeding. · Do not take aspirin if you are pregnant, unless your doctor says it is okay. · Keep all aspirin out of children's reach. · Throw aspirin away if it starts to smell like vinegar. · Do not take aspirin if you have gout or if you take prescription blood thinners, unless your doctor has told you to. · Do not take prescription or over-the-counter medicines, vitamins, herbal products, or supplements without talking to your doctor first. Elyssa Meghan the label before you take another over-the-counter medicine. Many contain aspirin. So they could cause you to take too much aspirin. · Talk with your doctor before you take a pain medicine. Ask which type of medicine you can take and how to take it safely with aspirin. · Tell your doctor or dentist before a surgery or procedure that you take aspirin. He or she will tell you if you should stop taking aspirin before your surgery or procedure. Make sure that you understand exactly what your doctor wants you to do. Where can you learn more? Go to http://letty-raisa.info/. Enter W802 in the search box to learn more about \"Taking Aspirin to Prevent Heart Attack and Stroke: Care Instructions. \" Current as of: September 21, 2016 Content Version: 11.4 © 2938-2984 [employer]. Care instructions adapted under license by [employer] (which disclaims liability or warranty for this information). If you have questions about a medical condition or this instruction, always ask your healthcare professional. [address] any warranty or liability for your use of this information. Please provide this summary of care documentation to your next provider. Signatures-by signing, you are acknowledging that this After Visit Summary has been reviewed with you and you have received a copy. Patient Signature:  ____________________________________________________________ Date:  ____________________________________________________________  
  
Rosalind Deiters Provider Signature:  ____________________________________________________________ Date:  ____________________________________________________________

## 2018-02-22 NOTE — IP AVS SNAPSHOT
[address] [address] 
[phone] Patient: Derrick Shearer MRN: [id number] :[id number] A check abigail indicates which time of day the medication should be taken. My Medications START taking these medications Instructions Each Dose to Equal  
 Morning Noon Evening Bedtime  
 apixaban 5 mg tablet Commonly known as:  Hussain Clay Your last dose was: Your next dose is: Take 1 Tab by mouth two (2) times a day. 5 mg  
    
   
   
   
  
 aspirin 81 mg chewable tablet Your last dose was: Your next dose is: Take 1 Tab by mouth daily. 81 mg  
    
   
   
   
  
 atorvastatin 40 mg tablet Commonly known as:  LIPITOR Your last dose was: Your next dose is: Take 1 Tab by mouth daily. 40 mg CONTINUE taking these medications Instructions Each Dose to Equal  
 Morning Noon Evening Bedtime ADVIL 200 mg tablet Generic drug:  ibuprofen Your last dose was: Your next dose is: Take 200-400 mg by mouth every eight (8) hours as needed for Pain. 200-400 mg  
    
   
   
   
  
 Gas-X 125 mg capsule Generic drug:  simethicone Your last dose was: Your next dose is: Take 125 mg by mouth four (4) times daily as needed for Flatulence. 125 mg  
    
   
   
   
  
 tamsulosin 0.4 mg capsule Commonly known as:  FLOMAX Your last dose was: Your next dose is: Take 0.4 mg by mouth every morning. 0.4 mg  
    
   
   
   
  
  
STOP taking these medications   
 lisinopril 10 mg tablet Commonly known as:  PRINIVIL, ZESTRIL  
   
  
 metoprolol tartrate 50 mg tablet Commonly known as:  LOPRESSOR Where to Get Your Medications Information on where to get these meds will be given to you by the nurse or doctor. ! Ask your nurse or doctor about these medications  
  apixaban 5 mg tablet  
 atorvastatin 40 mg tablet

## 2018-02-23 ENCOUNTER — APPOINTMENT (OUTPATIENT)
Dept: GENERAL RADIOLOGY | Age: 83
DRG: 282 | End: 2018-02-23
Attending: EMERGENCY MEDICINE
Payer: MEDICARE

## 2018-02-23 PROBLEM — I21.4 NSTEMI (NON-ST ELEVATED MYOCARDIAL INFARCTION) (HCC): Status: ACTIVE | Noted: 2018-02-23

## 2018-02-23 LAB
ALBUMIN SERPL-MCNC: 3.5 G/DL (ref 3.5–5)
ALBUMIN SERPL-MCNC: 3.7 G/DL (ref 3.5–5)
ALBUMIN/GLOB SERPL: 1 {RATIO} (ref 1.1–2.2)
ALBUMIN/GLOB SERPL: 1 {RATIO} (ref 1.1–2.2)
ALP SERPL-CCNC: 77 U/L (ref 45–117)
ALP SERPL-CCNC: 82 U/L (ref 45–117)
ALT SERPL-CCNC: 34 U/L (ref 12–78)
ALT SERPL-CCNC: 41 U/L (ref 12–78)
ANION GAP SERPL CALC-SCNC: 7 MMOL/L (ref 5–15)
ANION GAP SERPL CALC-SCNC: 8 MMOL/L (ref 5–15)
APTT PPP: 29.5 SEC (ref 22.1–32)
APTT PPP: 57.1 SEC (ref 22.1–32)
AST SERPL-CCNC: 117 U/L (ref 15–37)
AST SERPL-CCNC: 99 U/L (ref 15–37)
ATRIAL RATE: 75 BPM
ATRIAL RATE: 76 BPM
ATRIAL RATE: 77 BPM
BASOPHILS # BLD: 0 K/UL (ref 0–0.1)
BASOPHILS # BLD: 0 K/UL (ref 0–0.1)
BASOPHILS NFR BLD: 0 % (ref 0–1)
BASOPHILS NFR BLD: 0 % (ref 0–1)
BILIRUB SERPL-MCNC: 0.9 MG/DL (ref 0.2–1)
BILIRUB SERPL-MCNC: 1.1 MG/DL (ref 0.2–1)
BUN SERPL-MCNC: 13 MG/DL (ref 6–20)
BUN SERPL-MCNC: 15 MG/DL (ref 6–20)
BUN/CREAT SERPL: 15 (ref 12–20)
BUN/CREAT SERPL: 16 (ref 12–20)
CALCIUM SERPL-MCNC: 8.7 MG/DL (ref 8.5–10.1)
CALCIUM SERPL-MCNC: 9 MG/DL (ref 8.5–10.1)
CALCULATED P AXIS, ECG09: 41 DEGREES
CALCULATED P AXIS, ECG09: 73 DEGREES
CALCULATED R AXIS, ECG10: -13 DEGREES
CALCULATED R AXIS, ECG10: -13 DEGREES
CALCULATED R AXIS, ECG10: -20 DEGREES
CALCULATED T AXIS, ECG11: -19 DEGREES
CALCULATED T AXIS, ECG11: -4 DEGREES
CALCULATED T AXIS, ECG11: 10 DEGREES
CHLORIDE SERPL-SCNC: 102 MMOL/L (ref 97–108)
CHLORIDE SERPL-SCNC: 103 MMOL/L (ref 97–108)
CHOLEST SERPL-MCNC: 133 MG/DL
CO2 SERPL-SCNC: 28 MMOL/L (ref 21–32)
CO2 SERPL-SCNC: 28 MMOL/L (ref 21–32)
CREAT SERPL-MCNC: 0.85 MG/DL (ref 0.7–1.3)
CREAT SERPL-MCNC: 0.93 MG/DL (ref 0.7–1.3)
DIAGNOSIS, 93000: NORMAL
DIFFERENTIAL METHOD BLD: ABNORMAL
DIFFERENTIAL METHOD BLD: ABNORMAL
EOSINOPHIL # BLD: 0 K/UL (ref 0–0.4)
EOSINOPHIL # BLD: 0 K/UL (ref 0–0.4)
EOSINOPHIL NFR BLD: 0 % (ref 0–7)
EOSINOPHIL NFR BLD: 0 % (ref 0–7)
ERYTHROCYTE [DISTWIDTH] IN BLOOD BY AUTOMATED COUNT: 13.2 % (ref 11.5–14.5)
ERYTHROCYTE [DISTWIDTH] IN BLOOD BY AUTOMATED COUNT: 13.4 % (ref 11.5–14.5)
GLOBULIN SER CALC-MCNC: 3.4 G/DL (ref 2–4)
GLOBULIN SER CALC-MCNC: 3.6 G/DL (ref 2–4)
GLUCOSE SERPL-MCNC: 111 MG/DL (ref 65–100)
GLUCOSE SERPL-MCNC: 138 MG/DL (ref 65–100)
HCT VFR BLD AUTO: 37.9 % (ref 36.6–50.3)
HCT VFR BLD AUTO: 39.1 % (ref 36.6–50.3)
HDLC SERPL-MCNC: 53 MG/DL
HDLC SERPL: 2.5 {RATIO} (ref 0–5)
HGB BLD-MCNC: 12.7 G/DL (ref 12.1–17)
HGB BLD-MCNC: 13.3 G/DL (ref 12.1–17)
IMM GRANULOCYTES # BLD: 0.1 K/UL (ref 0–0.04)
IMM GRANULOCYTES # BLD: 0.1 K/UL (ref 0–0.04)
IMM GRANULOCYTES NFR BLD AUTO: 0 % (ref 0–0.5)
IMM GRANULOCYTES NFR BLD AUTO: 1 % (ref 0–0.5)
INR PPP: 1.1 (ref 0.9–1.1)
LDLC SERPL CALC-MCNC: 60.6 MG/DL (ref 0–100)
LIPID PROFILE,FLP: NORMAL
LYMPHOCYTES # BLD: 0.7 K/UL (ref 0.8–3.5)
LYMPHOCYTES # BLD: 0.9 K/UL (ref 0.8–3.5)
LYMPHOCYTES NFR BLD: 5 % (ref 12–49)
LYMPHOCYTES NFR BLD: 6 % (ref 12–49)
MCH RBC QN AUTO: 30 PG (ref 26–34)
MCH RBC QN AUTO: 30.1 PG (ref 26–34)
MCHC RBC AUTO-ENTMCNC: 33.5 G/DL (ref 30–36.5)
MCHC RBC AUTO-ENTMCNC: 34 G/DL (ref 30–36.5)
MCV RBC AUTO: 88.5 FL (ref 80–99)
MCV RBC AUTO: 89.6 FL (ref 80–99)
MONOCYTES # BLD: 1.6 K/UL (ref 0–1)
MONOCYTES # BLD: 1.6 K/UL (ref 0–1)
MONOCYTES NFR BLD: 10 % (ref 5–13)
MONOCYTES NFR BLD: 11 % (ref 5–13)
NEUTS SEG # BLD: 12.4 K/UL (ref 1.8–8)
NEUTS SEG # BLD: 12.5 K/UL (ref 1.8–8)
NEUTS SEG NFR BLD: 83 % (ref 32–75)
NEUTS SEG NFR BLD: 83 % (ref 32–75)
NRBC # BLD: 0 K/UL (ref 0–0.01)
NRBC # BLD: 0 K/UL (ref 0–0.01)
NRBC BLD-RTO: 0 PER 100 WBC
NRBC BLD-RTO: 0 PER 100 WBC
P-R INTERVAL, ECG05: 220 MS
P-R INTERVAL, ECG05: 222 MS
P-R INTERVAL, ECG05: 224 MS
PLATELET # BLD AUTO: 170 K/UL (ref 150–400)
PLATELET # BLD AUTO: 189 K/UL (ref 150–400)
PMV BLD AUTO: 10.3 FL (ref 8.9–12.9)
PMV BLD AUTO: 9.6 FL (ref 8.9–12.9)
POTASSIUM SERPL-SCNC: 3.6 MMOL/L (ref 3.5–5.1)
POTASSIUM SERPL-SCNC: 3.7 MMOL/L (ref 3.5–5.1)
PROT SERPL-MCNC: 6.9 G/DL (ref 6.4–8.2)
PROT SERPL-MCNC: 7.3 G/DL (ref 6.4–8.2)
PROTHROMBIN TIME: 11 SEC (ref 9–11.1)
Q-T INTERVAL, ECG07: 340 MS
Q-T INTERVAL, ECG07: 380 MS
Q-T INTERVAL, ECG07: 380 MS
QRS DURATION, ECG06: 102 MS
QRS DURATION, ECG06: 90 MS
QRS DURATION, ECG06: 96 MS
QTC CALCULATION (BEZET), ECG08: 379 MS
QTC CALCULATION (BEZET), ECG08: 427 MS
QTC CALCULATION (BEZET), ECG08: 430 MS
RBC # BLD AUTO: 4.23 M/UL (ref 4.1–5.7)
RBC # BLD AUTO: 4.42 M/UL (ref 4.1–5.7)
RBC MORPH BLD: ABNORMAL
SODIUM SERPL-SCNC: 138 MMOL/L (ref 136–145)
SODIUM SERPL-SCNC: 138 MMOL/L (ref 136–145)
THERAPEUTIC RANGE,PTTT: ABNORMAL SECS (ref 58–77)
THERAPEUTIC RANGE,PTTT: NORMAL SECS (ref 58–77)
TRIGL SERPL-MCNC: 97 MG/DL (ref ?–150)
TROPONIN I SERPL-MCNC: 12.45 NG/ML
TROPONIN I SERPL-MCNC: 12.5 NG/ML
TROPONIN I SERPL-MCNC: 12.65 NG/ML
VENTRICULAR RATE, ECG03: 75 BPM
VENTRICULAR RATE, ECG03: 76 BPM
VENTRICULAR RATE, ECG03: 77 BPM
VLDLC SERPL CALC-MCNC: 19.4 MG/DL
WBC # BLD AUTO: 14.9 K/UL (ref 4.1–11.1)
WBC # BLD AUTO: 14.9 K/UL (ref 4.1–11.1)

## 2018-02-23 PROCEDURE — 77030010547 HC BG URIN W/UMETER -A

## 2018-02-23 PROCEDURE — B2111ZZ FLUOROSCOPY OF MULTIPLE CORONARY ARTERIES USING LOW OSMOLAR CONTRAST: ICD-10-PCS | Performed by: SPECIALIST

## 2018-02-23 PROCEDURE — 85730 THROMBOPLASTIN TIME PARTIAL: CPT | Performed by: SPECIALIST

## 2018-02-23 PROCEDURE — 74011000250 HC RX REV CODE- 250: Performed by: EMERGENCY MEDICINE

## 2018-02-23 PROCEDURE — C1894 INTRO/SHEATH, NON-LASER: HCPCS

## 2018-02-23 PROCEDURE — 74011250636 HC RX REV CODE- 250/636: Performed by: INTERNAL MEDICINE

## 2018-02-23 PROCEDURE — 99153 MOD SED SAME PHYS/QHP EA: CPT

## 2018-02-23 PROCEDURE — 85610 PROTHROMBIN TIME: CPT | Performed by: EMERGENCY MEDICINE

## 2018-02-23 PROCEDURE — 36415 COLL VENOUS BLD VENIPUNCTURE: CPT | Performed by: EMERGENCY MEDICINE

## 2018-02-23 PROCEDURE — 65660000000 HC RM CCU STEPDOWN

## 2018-02-23 PROCEDURE — C1758 CATHETER, URETERAL: HCPCS

## 2018-02-23 PROCEDURE — 84484 ASSAY OF TROPONIN QUANT: CPT | Performed by: SPECIALIST

## 2018-02-23 PROCEDURE — C8929 TTE W OR WO FOL WCON,DOPPLER: HCPCS

## 2018-02-23 PROCEDURE — 93005 ELECTROCARDIOGRAM TRACING: CPT

## 2018-02-23 PROCEDURE — 74011636320 HC RX REV CODE- 636/320: Performed by: SPECIALIST

## 2018-02-23 PROCEDURE — 74011250637 HC RX REV CODE- 250/637: Performed by: EMERGENCY MEDICINE

## 2018-02-23 PROCEDURE — 74011000250 HC RX REV CODE- 250: Performed by: SPECIALIST

## 2018-02-23 PROCEDURE — 96375 TX/PRO/DX INJ NEW DRUG ADDON: CPT

## 2018-02-23 PROCEDURE — 77030004532 HC CATH ANGI DX IMP BSC -A

## 2018-02-23 PROCEDURE — 71045 X-RAY EXAM CHEST 1 VIEW: CPT

## 2018-02-23 PROCEDURE — 74011250636 HC RX REV CODE- 250/636: Performed by: SPECIALIST

## 2018-02-23 PROCEDURE — 96365 THER/PROPH/DIAG IV INF INIT: CPT

## 2018-02-23 PROCEDURE — 85730 THROMBOPLASTIN TIME PARTIAL: CPT | Performed by: EMERGENCY MEDICINE

## 2018-02-23 PROCEDURE — 77030004522 HC CATH ANGI DX EXPO BSC -A

## 2018-02-23 PROCEDURE — 77030029065 HC DRSG HEMO QCLOT ZMED -B

## 2018-02-23 PROCEDURE — 51798 US URINE CAPACITY MEASURE: CPT

## 2018-02-23 PROCEDURE — 74011250636 HC RX REV CODE- 250/636: Performed by: EMERGENCY MEDICINE

## 2018-02-23 PROCEDURE — B2151ZZ FLUOROSCOPY OF LEFT HEART USING LOW OSMOLAR CONTRAST: ICD-10-PCS | Performed by: SPECIALIST

## 2018-02-23 PROCEDURE — 80061 LIPID PANEL: CPT | Performed by: SPECIALIST

## 2018-02-23 PROCEDURE — 4A023N7 MEASUREMENT OF CARDIAC SAMPLING AND PRESSURE, LEFT HEART, PERCUTANEOUS APPROACH: ICD-10-PCS | Performed by: SPECIALIST

## 2018-02-23 PROCEDURE — 77030011943

## 2018-02-23 PROCEDURE — 85025 COMPLETE CBC W/AUTO DIFF WBC: CPT | Performed by: EMERGENCY MEDICINE

## 2018-02-23 PROCEDURE — 74011250637 HC RX REV CODE- 250/637: Performed by: SPECIALIST

## 2018-02-23 PROCEDURE — 77030034848

## 2018-02-23 PROCEDURE — 80053 COMPREHEN METABOLIC PANEL: CPT | Performed by: SPECIALIST

## 2018-02-23 RX ORDER — SODIUM CHLORIDE 0.9 % (FLUSH) 0.9 %
5-10 SYRINGE (ML) INJECTION AS NEEDED
Status: DISCONTINUED | OUTPATIENT
Start: 2018-02-23 | End: 2018-02-24 | Stop reason: HOSPADM

## 2018-02-23 RX ORDER — GUAIFENESIN 100 MG/5ML
81 LIQUID (ML) ORAL DAILY
Status: DISCONTINUED | OUTPATIENT
Start: 2018-02-24 | End: 2018-02-24 | Stop reason: HOSPADM

## 2018-02-23 RX ORDER — DIPHENHYDRAMINE HYDROCHLORIDE 50 MG/ML
25 INJECTION, SOLUTION INTRAMUSCULAR; INTRAVENOUS
Status: DISCONTINUED | OUTPATIENT
Start: 2018-02-23 | End: 2018-02-24 | Stop reason: HOSPADM

## 2018-02-23 RX ORDER — ASPIRIN 325 MG
325 TABLET ORAL DAILY
Status: DISCONTINUED | OUTPATIENT
Start: 2018-02-23 | End: 2018-02-23

## 2018-02-23 RX ORDER — MIDAZOLAM HYDROCHLORIDE 1 MG/ML
.5-1 INJECTION, SOLUTION INTRAMUSCULAR; INTRAVENOUS
Status: DISCONTINUED | OUTPATIENT
Start: 2018-02-23 | End: 2018-02-23

## 2018-02-23 RX ORDER — HEPARIN SODIUM 200 [USP'U]/100ML
500 INJECTION, SOLUTION INTRAVENOUS ONCE
Status: COMPLETED | OUTPATIENT
Start: 2018-02-23 | End: 2018-02-23

## 2018-02-23 RX ORDER — HEPARIN SODIUM 10000 [USP'U]/100ML
INJECTION, SOLUTION INTRAVENOUS
Status: DISPENSED
Start: 2018-02-23 | End: 2018-02-23

## 2018-02-23 RX ORDER — ATORVASTATIN CALCIUM 40 MG/1
40 TABLET, FILM COATED ORAL DAILY
Qty: 30 TAB | Refills: 5 | Status: SHIPPED | OUTPATIENT
Start: 2018-02-23

## 2018-02-23 RX ORDER — IBUPROFEN 200 MG
200-400 TABLET ORAL
COMMUNITY

## 2018-02-23 RX ORDER — NITROGLYCERIN 20 MG/100ML
0-20 INJECTION INTRAVENOUS
Status: DISCONTINUED | OUTPATIENT
Start: 2018-02-23 | End: 2018-02-23

## 2018-02-23 RX ORDER — GUAIFENESIN 100 MG/5ML
81 LIQUID (ML) ORAL DAILY
Status: SHIPPED | COMMUNITY
Start: 2018-02-24 | End: 2018-03-06

## 2018-02-23 RX ORDER — HEPARIN SODIUM 10000 [USP'U]/100ML
12-25 INJECTION, SOLUTION INTRAVENOUS
Status: DISCONTINUED | OUTPATIENT
Start: 2018-02-23 | End: 2018-02-23 | Stop reason: SDUPTHER

## 2018-02-23 RX ORDER — HEPARIN SODIUM 10000 [USP'U]/100ML
12-25 INJECTION, SOLUTION INTRAVENOUS
Status: DISCONTINUED | OUTPATIENT
Start: 2018-02-23 | End: 2018-02-23

## 2018-02-23 RX ORDER — LIDOCAINE HYDROCHLORIDE 10 MG/ML
20 INJECTION INFILTRATION; PERINEURAL
Status: DISCONTINUED | OUTPATIENT
Start: 2018-02-23 | End: 2018-02-23

## 2018-02-23 RX ORDER — SODIUM CHLORIDE 0.9 % (FLUSH) 0.9 %
5-10 SYRINGE (ML) INJECTION EVERY 8 HOURS
Status: DISCONTINUED | OUTPATIENT
Start: 2018-02-23 | End: 2018-02-23 | Stop reason: HOSPADM

## 2018-02-23 RX ORDER — FENTANYL CITRATE 50 UG/ML
25-200 INJECTION, SOLUTION INTRAMUSCULAR; INTRAVENOUS
Status: DISCONTINUED | OUTPATIENT
Start: 2018-02-23 | End: 2018-02-23

## 2018-02-23 RX ORDER — SODIUM CHLORIDE 0.9 % (FLUSH) 0.9 %
5-10 SYRINGE (ML) INJECTION AS NEEDED
Status: DISCONTINUED | OUTPATIENT
Start: 2018-02-23 | End: 2018-02-23 | Stop reason: HOSPADM

## 2018-02-23 RX ORDER — ATORVASTATIN CALCIUM 20 MG/1
40 TABLET, FILM COATED ORAL EVERY EVENING
Status: DISCONTINUED | OUTPATIENT
Start: 2018-02-23 | End: 2018-02-24 | Stop reason: HOSPADM

## 2018-02-23 RX ORDER — SODIUM CHLORIDE 9 MG/ML
150 INJECTION, SOLUTION INTRAVENOUS CONTINUOUS
Status: DISPENSED | OUTPATIENT
Start: 2018-02-23 | End: 2018-02-23

## 2018-02-23 RX ORDER — SODIUM CHLORIDE 0.9 % (FLUSH) 0.9 %
5-10 SYRINGE (ML) INJECTION EVERY 8 HOURS
Status: DISCONTINUED | OUTPATIENT
Start: 2018-02-23 | End: 2018-02-24 | Stop reason: HOSPADM

## 2018-02-23 RX ORDER — ATORVASTATIN CALCIUM 40 MG/1
40 TABLET, FILM COATED ORAL DAILY
Qty: 30 TAB | Refills: 5 | Status: SHIPPED | OUTPATIENT
Start: 2018-02-23 | End: 2018-02-23

## 2018-02-23 RX ORDER — HEPARIN SODIUM 5000 [USP'U]/ML
INJECTION, SOLUTION INTRAVENOUS; SUBCUTANEOUS
Status: DISPENSED
Start: 2018-02-23 | End: 2018-02-23

## 2018-02-23 RX ORDER — ONDANSETRON 2 MG/ML
4 INJECTION INTRAMUSCULAR; INTRAVENOUS
Status: DISCONTINUED | OUTPATIENT
Start: 2018-02-23 | End: 2018-02-24 | Stop reason: HOSPADM

## 2018-02-23 RX ORDER — HEPARIN SODIUM 5000 [USP'U]/ML
51.9 INJECTION, SOLUTION INTRAVENOUS; SUBCUTANEOUS ONCE
Status: COMPLETED | OUTPATIENT
Start: 2018-02-23 | End: 2018-02-23

## 2018-02-23 RX ORDER — ACETAMINOPHEN 325 MG/1
650 TABLET ORAL
Status: DISCONTINUED | OUTPATIENT
Start: 2018-02-23 | End: 2018-02-24 | Stop reason: HOSPADM

## 2018-02-23 RX ORDER — SODIUM CHLORIDE 9 MG/ML
75 INJECTION, SOLUTION INTRAVENOUS CONTINUOUS
Status: DISCONTINUED | OUTPATIENT
Start: 2018-02-23 | End: 2018-02-24 | Stop reason: HOSPADM

## 2018-02-23 RX ORDER — SODIUM CHLORIDE 9 MG/ML
75 INJECTION, SOLUTION INTRAVENOUS CONTINUOUS
Status: DISCONTINUED | OUTPATIENT
Start: 2018-02-23 | End: 2018-02-23 | Stop reason: HOSPADM

## 2018-02-23 RX ORDER — HYDROCORTISONE SODIUM SUCCINATE 100 MG/2ML
100 INJECTION, POWDER, FOR SOLUTION INTRAMUSCULAR; INTRAVENOUS
Status: DISCONTINUED | OUTPATIENT
Start: 2018-02-23 | End: 2018-02-24 | Stop reason: HOSPADM

## 2018-02-23 RX ORDER — ATORVASTATIN CALCIUM 40 MG/1
40 TABLET, FILM COATED ORAL EVERY EVENING
Qty: 30 TAB | Refills: 5 | Status: SHIPPED | OUTPATIENT
Start: 2018-02-23 | End: 2018-02-23

## 2018-02-23 RX ORDER — MORPHINE SULFATE 2 MG/ML
2 INJECTION, SOLUTION INTRAMUSCULAR; INTRAVENOUS
Status: DISCONTINUED | OUTPATIENT
Start: 2018-02-23 | End: 2018-02-24 | Stop reason: HOSPADM

## 2018-02-23 RX ADMIN — HEPARIN SODIUM IN SODIUM CHLORIDE 1000 UNITS: 200 INJECTION INTRAVENOUS at 14:16

## 2018-02-23 RX ADMIN — Medication 10 ML: at 14:00

## 2018-02-23 RX ADMIN — NITROGLYCERIN 5 MCG/MIN: 20 INJECTION INTRAVENOUS at 13:48

## 2018-02-23 RX ADMIN — PHENYLEPHRINE HYDROCHLORIDE 10 MCG/MIN: 10 INJECTION INTRAVENOUS at 23:44

## 2018-02-23 RX ADMIN — Medication 10 ML: at 21:32

## 2018-02-23 RX ADMIN — FENTANYL CITRATE 50 MCG: 50 INJECTION, SOLUTION INTRAMUSCULAR; INTRAVENOUS at 14:20

## 2018-02-23 RX ADMIN — PERFLUTREN 2 ML: 6.52 INJECTION, SUSPENSION INTRAVENOUS at 09:21

## 2018-02-23 RX ADMIN — IOPAMIDOL 136 ML: 755 INJECTION, SOLUTION INTRAVENOUS at 14:58

## 2018-02-23 RX ADMIN — ATORVASTATIN CALCIUM 40 MG: 20 TABLET, FILM COATED ORAL at 18:00

## 2018-02-23 RX ADMIN — SODIUM CHLORIDE 75 ML/HR: 900 INJECTION, SOLUTION INTRAVENOUS at 10:40

## 2018-02-23 RX ADMIN — ASPIRIN 325 MG: 325 TABLET, COATED ORAL at 10:43

## 2018-02-23 RX ADMIN — Medication 10 ML: at 18:01

## 2018-02-23 RX ADMIN — HEPARIN SODIUM 12 UNITS/KG/HR: 10000 INJECTION, SOLUTION INTRAVENOUS at 01:28

## 2018-02-23 RX ADMIN — ACETAMINOPHEN 650 MG: 325 TABLET ORAL at 20:18

## 2018-02-23 RX ADMIN — SODIUM CHLORIDE 75 ML/HR: 900 INJECTION, SOLUTION INTRAVENOUS at 21:29

## 2018-02-23 RX ADMIN — NITROGLYCERIN 10 MCG/MIN: 20 INJECTION INTRAVENOUS at 00:37

## 2018-02-23 RX ADMIN — MORPHINE SULFATE 2 MG: 2 INJECTION, SOLUTION INTRAMUSCULAR; INTRAVENOUS at 05:27

## 2018-02-23 RX ADMIN — ONDANSETRON 4 MG: 2 INJECTION INTRAMUSCULAR; INTRAVENOUS at 00:23

## 2018-02-23 RX ADMIN — MORPHINE SULFATE 2 MG: 2 INJECTION, SOLUTION INTRAMUSCULAR; INTRAVENOUS at 10:16

## 2018-02-23 RX ADMIN — Medication 10 ML: at 05:32

## 2018-02-23 RX ADMIN — SODIUM CHLORIDE 150 ML/HR: 900 INJECTION, SOLUTION INTRAVENOUS at 15:23

## 2018-02-23 RX ADMIN — SODIUM CHLORIDE 500 ML: 900 INJECTION, SOLUTION INTRAVENOUS at 00:30

## 2018-02-23 RX ADMIN — LIDOCAINE HYDROCHLORIDE 20 ML: 10 INJECTION, SOLUTION INFILTRATION; PERINEURAL at 14:31

## 2018-02-23 RX ADMIN — MORPHINE SULFATE 6 MG: 2 INJECTION, SOLUTION INTRAMUSCULAR; INTRAVENOUS at 00:25

## 2018-02-23 RX ADMIN — ASPIRIN 81 MG 162 MG: 81 TABLET ORAL at 00:22

## 2018-02-23 RX ADMIN — HEPARIN SODIUM 4000 UNITS: 5000 INJECTION, SOLUTION INTRAVENOUS; SUBCUTANEOUS at 01:26

## 2018-02-23 RX ADMIN — ACETAMINOPHEN 650 MG: 325 TABLET ORAL at 10:16

## 2018-02-23 RX ADMIN — MIDAZOLAM 1 MG: 1 INJECTION INTRAMUSCULAR; INTRAVENOUS at 14:20

## 2018-02-23 NOTE — ED PROVIDER NOTES
HPI Comments: 81 y.o. male with past medical history significant for HTN, PUD, who presents ambulatory to the ED accompanied by wife, with chief complaint of diffuse upper CP and posterior neck pain since yesterday (2/21/2018). Pt states that his pain has been constant and progressively worsening in severity since yesterday. Notes that he saw his PCP yesterday about the CP and had a normal EKG. The CP and posterior neck pain has persisted all day today, and he ranks his current level of discomfort as a 9-10 out of 10 in severity. While the pain is constant, he reports exacerbation with breathing and when lying flat. Notes that he was unable to get comfortable tonight, so he decided to come to the ED for further evaluation. Wife notes that patient had an episode two nights ago (Tuesday 2/20/2018) where he started \"thrashing and growling\" in his sleep, and she was unable to wake the patient up for a period of 10 minutes. The next morning, pt felt the onset of nausea and vomiting, and the nausea and vomiting has persisted since then. He initially thought the n/v was secondary to \"indigestion\" so he took OTC medications for indigestion without relief. Pt had n/v this morning associated with the continued CP, and he also c/o SOB at this time. Denies diaphoresis, and also denies h/o similar sx or irregular heart beat. Denies h/o cardiac cath, but reports that he had a stress test several years ago. There are no other acute medical concerns at this time. Social hx: Negative for Tobacco use; Negative for EtOH use (h/o alcohol abuse); Negative for Illicit Drug Abuse    PCP: Gisele Mazariegos MD  Cardiology: Patrick Ray MD     Note written by Erasto Madison, as dictated by Sharif Calderon MD 11:55 PM     The history is provided by the patient and the spouse. No  was used.         Past Medical History:   Diagnosis Date    Hypertension     PUD (peptic ulcer disease)     Pt states he had a 'peptic ulcer' in his early 25s       Past Surgical History:   Procedure Laterality Date    HX CATARACT REMOVAL Bilateral 2015    HX HERNIA REPAIR Right 02/2015    Right inguinal hernia repair    HX KNEE REPLACEMENT Right 11/2016    HX KNEE REPLACEMENT Left 07/2016         Family History:   Problem Relation Age of Onset    Cancer Mother      breast    No Known Problems Father     No Known Problems Brother     No Known Problems Brother        Social History     Social History    Marital status:      Spouse name: N/A    Number of children: N/A    Years of education: N/A     Occupational History    Not on file. 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 Review of Systems   Constitutional: Negative for diaphoresis and fever. HENT: Negative for congestion, nosebleeds and sore throat. Eyes: Negative for discharge. Respiratory: Positive for shortness of breath. Negative for cough. Cardiovascular: Positive for chest pain. Gastrointestinal: Positive for nausea and vomiting. Negative for abdominal pain and diarrhea. Genitourinary: Negative for dysuria. Musculoskeletal: Positive for neck pain. Skin: Negative for rash. Neurological: Negative for weakness and headaches. Hematological: Negative for adenopathy. Psychiatric/Behavioral: Negative. All other systems reviewed and are negative.     Patient Vitals for the past 12 hrs:   Temp Pulse Resp BP SpO2   02/23/18 0045 - 83 19 124/66 95 %   02/23/18 0037 - 70 - 116/68 -   02/23/18 0030 - 94 21 116/68 94 %   02/23/18 0000 - 79 18 94/41 95 %   02/22/18 2345 - 89 14 125/65 94 %   02/22/18 2330 - 78 22 125/58 95 % 02/22/18 2315 - 71 17 120/69 96 %   02/22/18 2308 98.4 °F (36.9 °C) 95 12 142/75 97 %        Physical Exam   Constitutional: He is oriented to person, place, and time. He appears well-developed and well-nourished. HENT:   Head: Normocephalic and atraumatic. Mouth/Throat: Oropharynx is clear and moist.   Eyes: Conjunctivae are normal.   Neck: Normal range of motion. Neck supple. Cardiovascular: Normal rate, regular rhythm and normal heart sounds. Pulmonary/Chest: Effort normal and breath sounds normal.   Abdominal: Soft. Bowel sounds are normal. There is no tenderness. Musculoskeletal: Normal range of motion. He exhibits no edema or tenderness. Neurological: He is alert and oriented to person, place, and time. Skin: Skin is warm and dry. Psychiatric: He has a normal mood and affect. His behavior is normal.   Nursing note and vitals reviewed. Note written by Charly Harvey. Elzbieta Fuentes, as dictated by Adela Cat MD 11:55 PM         MDM  Number of Diagnoses or Management Options  Critical Care  Total time providing critical care: 30-74 minutes (35 minutes)        ED Course       Procedures     ED EKG interpretation:  Rhythm: normal sinus rhythm; and regular . Rate (approx.): 76; Axis: normal; P wave: prolonged; QRS interval: normal ; ST/T wave:<1 mm elevation III, avf. Depression in V2 and V3. Q waves present. This EKG was interpreted by Adela Cat MD,ED Provider. ED EKG interpretation:  Rhythm: normal sinus rhythm; and regular . Rate (approx.): 77; Axis: normal; P wave: normal; QRS interval: normal ; inferior Q waves present. This EKG was interpreted by Adela Cat MD,ED Provider. PROGRESS NOTE:  11:54 PM   Pt's initial EKG given to different attending and then placed on my desk. My name became assigned to the patient by nursing staff. Was unaware of EKG or that patient was assigned to me until now.      PROGRESS NOTE:  12:11 AM   Pt states that his pain is worse and 1st EKG is concerning. Will obtain repeat EKG. PROGRESS NOTE:  12:23 AM   Troponin is elevated. Will consult with cardiology about patient's case. PROGRESS NOTE:  12:28 AM  Communicated via Tiger Text with Dr. Shelbi Interiano, who is listed on the call-schedule for cardiology for today. Sent patient's EKG's via iSpecimen. Dr. Shelbi Interiano responded and states that he is not on-call. Will ask  to page answering service to establish who is taking call tonight. CONSULT NOTE:  12:31 AM Doe Paul MD spoke with Dr. Leonila Ramírez MD, Consult for Cardiology. Discussed available diagnostic tests and clinical findings. He has reviewed EKGs. Dr. Kellen Khan recommends calling STEMI alert. CONSULT NOTE:  12:34 AM Doe Paul MD spoke with Dr. Bibiana Espinosa MD, Consult for Cardiology. Discussed available diagnostic tests and clinical findings. Dr. Lars Linares will look at patient's ED EKGs and call-back with further recommendations. CONSULT NOTE:  12:49 AM Doe Paul MD spoke with Dr. Lars Linares, Consult for Cardiology. Discussed available diagnostic tests and clinical findings. Dr. Lars Linares reviewed EKGs and spoke with Dr. Kellen Khan. States that patient does not meet criteria for STEMI. Dr. Lars Linares states that Dr. Kellen Khan will evaluate the patient, and recommends ordering Heparin. Will cancel STEMI alert. A/P:  1. NSTEMI - EKG concerning for ischemia. Treated with aspirin, morphine, nitro. Heparin drip initiated.

## 2018-02-23 NOTE — ED TRIAGE NOTES
Pt brought straight to room 11 d/t symptoms. Pt reports middle chest pain that worsens w/ breathing in x3 days, got worse today.   Pt seen yesterday at PCP for same symptoms, reports EKG was \"normal.\"

## 2018-02-23 NOTE — PROGRESS NOTES
1505-  Verbal report received from Octavia rn(name) on Asya Lew  being received from cath lab (unit) for routine progression of care      Report consisted of patients Situation, Background, Assessment and   Recommendations(SBAR). Information from the following report(s) Procedure Summary, Intake/Output, Recent Results and Cardiac Rhythm afib was reviewed with the receiving nurse. Opportunity for questions and clarification was provided. Patient off floor to cath lab. Bedside shift change report given to Stephane Kumar (oncoming nurse) by Bud Lindsey (offgoing nurse).  Report included the following information SBAR, Kardex, Procedure Summary, Intake/Output, MAR, Recent Results and Cardiac Rhythm NSR with 1st AVB

## 2018-02-23 NOTE — PROGRESS NOTES
Problem: Unstable angina/NSTEMI: Day of Admission/Day 1  Goal: Respiratory  Outcome: Progressing Towards Goal  2L NC  Goal: *Lungs clear or at baseline  Outcome: Progressing Towards Goal  CTA

## 2018-02-23 NOTE — PROGRESS NOTES
TRANSFER - OUT REPORT:    Verbal report given to Deloris Olvera RN(name) on Remi Reis  being transferred to ICU(unit) for routine progression of care       Report consisted of patients Situation, Background, Assessment and   Recommendations(SBAR). Information from the following report(s) SBAR and Kardex was reviewed with the receiving nurse. Lines:   Peripheral IV 02/23/18 Left Antecubital (Active)   Site Assessment Clean, dry, & intact 2/23/2018 12:01 PM   Phlebitis Assessment 0 2/23/2018 12:01 PM   Infiltration Assessment 0 2/23/2018 12:01 PM   Dressing Status Clean, dry, & intact 2/23/2018 12:01 PM   Dressing Type Transparent;Tape 2/23/2018 12:01 PM   Hub Color/Line Status Pink; Infusing 2/23/2018 12:01 PM   Action Taken Blood drawn 2/23/2018 12:25 AM       Peripheral IV 02/23/18 Right Wrist (Active)   Site Assessment Clean, dry, & intact 2/23/2018 12:01 PM   Phlebitis Assessment 0 2/23/2018 12:01 PM   Infiltration Assessment 0 2/23/2018 12:01 PM   Dressing Status Clean, dry, & intact 2/23/2018 12:01 PM   Dressing Type Transparent;Tape 2/23/2018 12:01 PM   Hub Color/Line Status Pink; Infusing 2/23/2018 12:01 PM        Opportunity for questions and clarification was provided.       Patient transported with:   Monitor  Registered Nurse        Update on patient status, patient in atrial fib/flutter upon arrival to cath lab; sheath pulled and hemostasis obtained per policy and procedure

## 2018-02-23 NOTE — PROGRESS NOTES
Pt seen and examined. Lingering chest discomfort. Agree that IMI likely complete. BP too low for ACEI/BB at this time. Cath/poss at 1:45pm.  The risks (including but not limited to death, myocardial infarction, cerebrovascular accident, dysrhythmia, renal failure, vascular complication, allergy, and/or need for emergency surgery), benefits, and alternatives have been explained. Verbal informed consent has been obtained. Further mgmt based on cath.

## 2018-02-23 NOTE — ED NOTES
TRANSFER - OUT REPORT:    Verbal report given to Pastor Quiles RN (name) on Tariq Lozada  being transferred to ICU 1 (unit) for routine progression of care       Report consisted of patients Situation, Background, Assessment and   Recommendations(SBAR). Information from the following report(s) SBAR, ED Summary, MAR, Recent Results and Cardiac Rhythm sinus rhythm IVCD was reviewed with the receiving nurse. Lines:   Peripheral IV 02/23/18 Left Antecubital (Active)   Site Assessment Clean, dry, & intact 2/23/2018  3:00 AM   Phlebitis Assessment 0 2/23/2018  3:00 AM   Infiltration Assessment 0 2/23/2018  3:00 AM   Dressing Status Clean, dry, & intact 2/23/2018  3:00 AM   Dressing Type Tape;Transparent 2/23/2018  3:00 AM   Hub Color/Line Status Infusing;Flushed;Patent 2/23/2018  3:00 AM   Action Taken Blood drawn 2/23/2018 12:25 AM       Peripheral IV 02/23/18 Right Wrist (Active)   Site Assessment Clean, dry, & intact 2/23/2018  3:00 AM   Phlebitis Assessment 0 2/23/2018  3:00 AM   Infiltration Assessment 0 2/23/2018  3:00 AM   Dressing Status Clean, dry, & intact 2/23/2018  3:00 AM   Dressing Type Tape;Transparent 2/23/2018  3:00 AM   Hub Color/Line Status Pink; Infusing 2/23/2018  3:00 AM        Opportunity for questions and clarification was provided.       Patient transported with:   Monitor  Registered Nurse

## 2018-02-23 NOTE — ED NOTES
Pt moved to room 14 and placed in hospital bed for comfort. Lights dimmed, pt resting, stable, and denies having any concerns at this time.

## 2018-02-23 NOTE — PROGRESS NOTES
[facility]: MED RECONCILIATION    Comments/Recommendations:   Patient is awake, alert, and knowledgeable about home medications   Verifies no known allergies  Denies recent changes to home medication regimen. Pharmacist can confirm with refill history that the patient is taking metoprolol tartrate. The patient reports he is take metoprolol tartrate 50 mg once daily and has done so for an extended period of time. Consider changing to metoprolol succinate XL 50 mg daily. Blood pressure and HR are monitored at home. On average the patient reports his BP is 130/70 and HR is 60 to 100. The patient rarely uses ibuprofen. Cautioned the patient about chronic use of NSAIDS and the increased risk of GI bleed and acute renal failure. Allergies: Review of patient's allergies indicates no known allergies. Prior to Admission Medications:   Prior to Admission Medications   Prescriptions Last Dose Informant Patient Reported? Taking?   ibuprofen (ADVIL) 200 mg tablet 2/20/2018  Yes Yes   Sig: Take 200-400 mg by mouth every eight (8) hours as needed for Pain. lisinopril (PRINIVIL, ZESTRIL) 10 mg tablet 2/22/2018 at Unknown time Self Yes Yes   Sig: Take 10 mg by mouth every morning. metoprolol tartrate (LOPRESSOR) 50 mg tablet 2/22/2018 at Unknown time Self Yes Yes   Sig: Take 50 mg by mouth every morning. simethicone (GAS-X) 125 mg capsule 2/22/2018 at Unknown time Self Yes Yes   Sig: Take 125 mg by mouth four (4) times daily as needed for Flatulence. tamsulosin (FLOMAX) 0.4 mg capsule 2/22/2018 at Unknown time Self Yes Yes   Sig: Take 0.4 mg by mouth every morning.       Facility-Administered Medications: None      Thank you,    Annette Baxter, PharmD, BCPS

## 2018-02-23 NOTE — PROCEDURES
Cath:  Obstructive 1VD:     LAD p30. LCx patent. RCA p100  Preserved LV systolic function:     EF 55%     Inferobasal AK. No AVG, MR 1-2+  RFA manual    Medical mgmt of IMI which probably occurred Tuesday night. Q's now present on EKG. Risk of PCI RCA > Benefits. ASA, lipitor. BP/HR too low at this time for BB, no indication for ACEI/ARB. New AFib. Was not in AF this AM, but in on arrival in cath lab. Intrinsically rate-controlled. Will start eliquis 5mg bid. Hopefully home Sat or Sun.  D/C meds should include ASA, Lipitor, eliquis. Probably would not resume home lisinopril or metoprolol given low BP/HR. Has F/U with Dr. Royal Henson 3/2.

## 2018-02-23 NOTE — PROGRESS NOTES
Primary Nurse Fany Godfrey RN and Karen Steele RN performed a dual skin assessment on this patient No impairment noted  Guzman score is 19

## 2018-02-23 NOTE — PROGRESS NOTES
Coupon for eliquis given to pt for one free month and the coupon that reduces the co-pay to 10 dollars a monthI informed wife that because pt has dondeEstaâ„¢ as his secondary ,the coupon will not likely honor the 10 $ coupon.   .Referral to FIONA MAGALLANES [facility] for [address]

## 2018-02-23 NOTE — ED NOTES
Transport to ICU 1 in stable condition and chest pain-free on cardiac monitor with IV NTG gtt continued on admission NTG IV infusing @ 10mcg/min or 3 ml/hr per pump. Wife along with patient on transport. Bedside handoff completed.

## 2018-02-23 NOTE — PROGRESS NOTES
Bedside and Verbal shift change report given to Arleth Bran RN (oncoming nurse) by Pedro Kennedy RN (offgoing nurse). Report included the following information SBAR, Kardex, MAR and Cardiac Rhythm Afib. 1540 - Patient back from cath lab. Dressing to right groin dry and intact. Blood pressure 81/59. NS running at 150 mL/hr.     1555 - BP 92/57. In afib with pauses of 1.27 seconds. Patient asymptomatic. No complaints of pain or discomfort. Spoke with Dr. Cy Jennings. Orders to call if pauses are 3 seconds or longer or if patient is symptomatic. [id number] - Patient states he has not urinated today. Bladder scan performed with >389 cc. Will call MD for orders. [address] [facility] [address]. Cy Jennings office concerning bladder scan and patient's blood pressure. Will wait for call back. 56 - Retried Dr. Cy Jennings office. Dr. Aretta Leventhal on call. Will wait for call back. [address] with Dr. Aretta Leventhal concerning bladder scan results and patient's BP of 82/59. Telephone orders for straight cath given. Orders to hold BP medications. No other orders given. Will monitor. 1800 - /71. Will monitor. [address], RN ICU straight cath patient. Total of 850 mL of urine output. Bedside and Verbal shift change report given to Melida Chamorro RN (oncoming nurse) by Arleth Bran RN (offgoing nurse). Report included the following information SBAR, Kardex, MAR and Cardiac Rhythm Afib.

## 2018-02-23 NOTE — ED NOTES
Pt called out for nurse c/o increased chest pain and \"not feeling well at all. \" Pt found sitting up in bed, labored breathing, pale, and appears in distress. Dr. Obinna Medina notified and repeat EKG done at [address] at bedside at this time to place new IV so meds can be given by nurse.

## 2018-02-23 NOTE — CARDIO/PULMONARY
Cardiac Rehab: 2/23/2018 @ 1030:  Received cardiac rehab consult for MI education. MI education folder, with catheterization brochure, to bedside of Liset Bowling. Pt admitted today with chest pain, elevated troponins (peak - 12.65). Pt is scheduled for cardiac cath this AM.  Pt presently on Tridil drip and admitted to [facility]. Cardiac history includes:  HTN. Prior to admission cardiac meds include:  lisinopril and metoprolol. Smoking history assessed:   Never smoked. Met with pt lying in bed. Wife and dgt present at bedside and received permission to discuss health issues with family preasent. Educated using teach back method. Reviewed MI diagnosis definition and purpose of intervention. Pt/wife reported he had a \"strange episode\" on Tuesday night in his sleep with restlessness and \"strange sounds. '\" The next morning he has chest tightness and N/V. He did not think of MI but GI issues and went to PCP at which time nothing was found. He continued to have chest tightness and came to ED last night. Pt/family now aware of MI and going to cath lab for cardiac cath today. Reviewed purpose of cardiac catheterization, for understanding. Encouraged consideration of lifestyle changes, regardless of cath outcome. Offered Cardiac Rehab Program for support in making lifestyle changes, if indicated. Discussed addition of new meds: ASA and Lipitor - purpose and side effects. Also discuss Tridil drip-purpose. Staff nurse reported pt has just received morphine for chest discomfort. Pt did drift off and on during session however family was attentive and asked questions. Will continue to follow for educational needs and cardiac catheterization results.

## 2018-02-23 NOTE — DISCHARGE INSTRUCTIONS
Discharge Instructions:     Medications: Activity:     As tolerated except do not lift over 10 pounds for 5 days. Diet:     American Heart Association. Follow-up:       Follow up with Dontae Ivan MD on March 2nd. 2018 at 9:30am.  [address]  [phone]      If you smoke, STOP! Heart Attack: Care Instructions  Your Care Instructions    A heart attack (myocardial infarction, or MI) occurs when one or more of the coronary arteries, which supply the heart with oxygen-rich blood, is blocked. A blockage usually occurs when plaque inside the artery breaks open and a blood clot forms in the artery. After a heart attack, you may be worried about your future. Over the next several weeks, your heart will start to heal. Though it can be hard to break old habits, you can prevent another heart attack by making some lifestyle changes and by taking medicines. You may use this information for ideas about what to do at home to speed your recovery. Follow-up care is a key part of your treatment and safety. Be sure to make and go to all appointments, and call your doctor if you are having problems. It's also a good idea to know your test results and keep a list of the medicines you take. How can you care for yourself at home? Activity  ? · Until your doctor says it is okay, do not do strenuous exercise. And do not lift, pull, or push anything heavy. Ask your doctor what types of activities are safe for you. ? · If your doctor has not set you up with a cardiac rehabilitation (rehab) program, talk to him or her about whether that is right for you. Cardiac rehab includes supervised exercise. It also includes help with diet and lifestyle changes and emotional support. It may reduce your risk of future heart problems. ? · Increase your activities slowly. Take short rest breaks when you get tired. ? · If your doctor recommends it, get more exercise.  Walking is a good choice. Bit by bit, increase the amount you walk every day. Try for at least 30 minutes on most days of the week. You also may want to swim, bike, or do other activities. Talk with your doctor before you start an exercise program to make sure it is safe for you. ? · Ask your doctor when you can drive, go back to work, and do other daily activities again. ? · You can have sex as soon as you feel ready for it. Often this means when you can easily walk around or climb stairs. Talk with your doctor if you have any concerns. If you are taking nitroglycerin, do not take erection-enhancing medicine such as sildenafil (Viagra), tadalafil (Cialis), or vardenafil (Levitra) . ? Lifestyle changes  ? · Do not smoke. Smoking increases your risk of another heart attack. If you need help quitting, talk to your doctor about stop-smoking programs and medicines. These can increase your chances of quitting for good. ? · Eat a heart-healthy diet that is low in saturated fat and salt, and is full of fruits, vegetables and whole-grains. Eat at least two servings of fish each week. You may get more details about how to eat healthy. But these tips can help you get started. ? · Stay at a healthy weight, or lose weight if you need to. Medicines  ? · Be safe with medicines. Take your medicines exactly as prescribed. Call your doctor if you think you are having a problem with your medicine. You will get more details on the specific medicines your doctor prescribes. Do not stop taking your medicine unless your doctor tells you to. Not taking your medicine might raise your risk of having another heart attack. ? · You may need several medicines to help lower your risk of another heart attack. These include:  ¨ Blood pressure medicines such as angiotensin-converting enzyme (ACE) inhibitors, ARBs (angiotensin II receptor blockers), and beta-blockers. ¨ Cholesterol medicine called statins. ¨ Aspirin and other blood thinners.  These prevent blood clots that can cause a heart attack. ? · If your doctor has given you nitroglycerin, keep it with you at all times. If you have angina symptoms, such as chest pain or pressure, sit down and rest. Take the first dose of nitroglycerin as directed. If symptoms get worse or are not getting better within 5 minutes, call 911 right away. Stay on the phone. The emergency  will tell you what to do. ? · Do not take any over-the-counter medicines, vitamins, or herbal products without talking to your doctor first.   ?Staying healthy  ? · Manage other health conditions such as high blood pressure and diabetes. ? · Avoid colds and flu. Get a pneumococcal vaccine shot. If you have had one before, ask your doctor whether you need another dose. Get the flu vaccine every year. If you must be around people with colds or flu, wash your hands often. ? · Be sure to tell your doctor about any angina symptoms you have had, even if they went away. Pay attention to your angina symptoms. Know what is typical for you and learn how to control it. Know when to call for help. ? · Talk to your family, friends, or a counselor about your feelings. It is normal to feel frightened, angry, hopeless, helpless, and even guilty. Talking openly about bad feelings can help you cope. If you have symptoms of depression, talk to your doctor. When should you call for help? Call 911 anytime you think you may need emergency care. For example, call if:  ? · You have symptoms of a heart attack. These may include:  ¨ Chest pain or pressure, or a strange feeling in the chest.  ¨ Sweating. ¨ Shortness of breath. ¨ Nausea or vomiting. ¨ Pain, pressure, or a strange feeling in the back, neck, jaw, or upper belly or in one or both shoulders or arms. ¨ Lightheadedness or sudden weakness. ¨ A fast or irregular heartbeat. After you call 911, the  may tell you to chew 1 adult-strength or 2 to 4 low-dose aspirin.  Wait for an ambulance. Do not try to drive yourself. ? · You have angina symptoms (such as chest pain or pressure) that do not go away with rest or are not getting better within 5 minutes after you take a dose of nitroglycerin. ? · You passed out (lost consciousness). ? · You feel like you are having another heart attack. ?Call your doctor now or seek immediate medical care if:  ? · You are having angina symptoms, such as chest pain or pressure, more often than usual, or the symptoms are different or worse than usual.   ? · You have new or increased shortness of breath. ? · You are dizzy or lightheaded, or you feel like you may faint. ? Watch closely for changes in your health, and be sure to contact your doctor if you have any problems. Where can you learn more? Go to http://letty-raisa.info/. Enter [id number] in the search box to learn more about \"Heart Attack: Care Instructions. \"  Current as of: September 21, 2016  Content Version: 11.4  © 0417-1351 [employer]. Care instructions adapted under license by [employer] (which disclaims liability or warranty for this information). If you have questions about a medical condition or this instruction, always ask your healthcare professional. [address] any warranty or liability for your use of this information.

## 2018-02-23 NOTE — PROGRESS NOTES
TRANSFER - IN REPORT:    Patient consented for cardiac catheterization; family and wife had no further questions;

## 2018-02-23 NOTE — ED NOTES
Verbal/Bedside report was given to Jere Campbell who will assume care of patient at this time. SBAR report consisted of ED summary, MAR, recent results, and additional pertinent information. Receiving nurse given opportunity to ask questions and seek clarifications. Receiving nurse aware of pending lab results and orders that to be completed.

## 2018-02-23 NOTE — PROGRESS NOTES
1200- Patient unable to void. MD telephoned regarding patient's bladder scan of 357ml. No new orders at this time.

## 2018-02-23 NOTE — PROGRESS NOTES
1000- TRANSFER - IN REPORT:    Verbal report received from Socrates Francisco (name) on Alice Israel  being received from ED (unit) for routine progression of care      Report consisted of patients Situation, Background, Assessment and   Recommendations(SBAR). Information from the following report(s) SBAR, Kardex, ED Summary, MAR, Recent Results and Cardiac Rhythm NSR was reviewed with the receiving nurse. Opportunity for questions and clarification was provided. Assessment completed upon patients arrival to unit and care assumed.

## 2018-02-24 ENCOUNTER — HOME HEALTH ADMISSION (OUTPATIENT)
Dept: HOME HEALTH SERVICES | Facility: HOME HEALTH | Age: 83
End: 2018-02-24

## 2018-02-24 VITALS
SYSTOLIC BLOOD PRESSURE: 107 MMHG | DIASTOLIC BLOOD PRESSURE: 59 MMHG | OXYGEN SATURATION: 95 % | RESPIRATION RATE: 18 BRPM | HEIGHT: 67 IN | HEART RATE: 70 BPM | TEMPERATURE: 98.4 F | WEIGHT: 171.96 LBS | BODY MASS INDEX: 26.99 KG/M2

## 2018-02-24 LAB
ALBUMIN SERPL-MCNC: 2.5 G/DL (ref 3.5–5)
ANION GAP SERPL CALC-SCNC: 8 MMOL/L (ref 5–15)
APTT PPP: 45.9 SEC (ref 22.1–32)
BASOPHILS # BLD: 0 K/UL (ref 0–0.1)
BASOPHILS NFR BLD: 0 % (ref 0–1)
BNP SERPL-MCNC: 2888 PG/ML (ref 0–450)
BUN SERPL-MCNC: 11 MG/DL (ref 6–20)
BUN/CREAT SERPL: 13 (ref 12–20)
CALCIUM SERPL-MCNC: 7.5 MG/DL (ref 8.5–10.1)
CHLORIDE SERPL-SCNC: 108 MMOL/L (ref 97–108)
CO2 SERPL-SCNC: 24 MMOL/L (ref 21–32)
CREAT SERPL-MCNC: 0.85 MG/DL (ref 0.7–1.3)
DIFFERENTIAL METHOD BLD: ABNORMAL
EOSINOPHIL # BLD: 0.1 K/UL (ref 0–0.4)
EOSINOPHIL NFR BLD: 1 % (ref 0–7)
ERYTHROCYTE [DISTWIDTH] IN BLOOD BY AUTOMATED COUNT: 13.6 % (ref 11.5–14.5)
GLUCOSE SERPL-MCNC: 92 MG/DL (ref 65–100)
HCT VFR BLD AUTO: 33.6 % (ref 36.6–50.3)
HGB BLD-MCNC: 10.8 G/DL (ref 12.1–17)
IMM GRANULOCYTES # BLD: 0 K/UL (ref 0–0.04)
IMM GRANULOCYTES NFR BLD AUTO: 0 % (ref 0–0.5)
LYMPHOCYTES # BLD: 1.2 K/UL (ref 0.8–3.5)
LYMPHOCYTES NFR BLD: 12 % (ref 12–49)
MCH RBC QN AUTO: 29.5 PG (ref 26–34)
MCHC RBC AUTO-ENTMCNC: 32.1 G/DL (ref 30–36.5)
MCV RBC AUTO: 91.8 FL (ref 80–99)
MONOCYTES # BLD: 0.9 K/UL (ref 0–1)
MONOCYTES NFR BLD: 9 % (ref 5–13)
NEUTS SEG # BLD: 8 K/UL (ref 1.8–8)
NEUTS SEG NFR BLD: 78 % (ref 32–75)
NRBC # BLD: 0 K/UL (ref 0–0.01)
NRBC BLD-RTO: 0 PER 100 WBC
PLATELET # BLD AUTO: 157 K/UL (ref 150–400)
PMV BLD AUTO: 9.9 FL (ref 8.9–12.9)
POTASSIUM SERPL-SCNC: 3.6 MMOL/L (ref 3.5–5.1)
RBC # BLD AUTO: 3.66 M/UL (ref 4.1–5.7)
SODIUM SERPL-SCNC: 140 MMOL/L (ref 136–145)
THERAPEUTIC RANGE,PTTT: ABNORMAL SECS (ref 58–77)
WBC # BLD AUTO: 10.2 K/UL (ref 4.1–11.1)

## 2018-02-24 PROCEDURE — 51798 US URINE CAPACITY MEASURE: CPT

## 2018-02-24 PROCEDURE — 77030034696 HC CATH URETH FOL 2W BARD -A

## 2018-02-24 PROCEDURE — 82040 ASSAY OF SERUM ALBUMIN: CPT | Performed by: SPECIALIST

## 2018-02-24 PROCEDURE — 74011250637 HC RX REV CODE- 250/637: Performed by: SPECIALIST

## 2018-02-24 PROCEDURE — 85730 THROMBOPLASTIN TIME PARTIAL: CPT | Performed by: SPECIALIST

## 2018-02-24 PROCEDURE — 36415 COLL VENOUS BLD VENIPUNCTURE: CPT | Performed by: SPECIALIST

## 2018-02-24 PROCEDURE — 83880 ASSAY OF NATRIURETIC PEPTIDE: CPT | Performed by: INTERNAL MEDICINE

## 2018-02-24 PROCEDURE — 74011250637 HC RX REV CODE- 250/637: Performed by: INTERNAL MEDICINE

## 2018-02-24 PROCEDURE — 77030011943

## 2018-02-24 PROCEDURE — 85025 COMPLETE CBC W/AUTO DIFF WBC: CPT | Performed by: INTERNAL MEDICINE

## 2018-02-24 PROCEDURE — 80048 BASIC METABOLIC PNL TOTAL CA: CPT | Performed by: SPECIALIST

## 2018-02-24 PROCEDURE — 74011250636 HC RX REV CODE- 250/636: Performed by: INTERNAL MEDICINE

## 2018-02-24 RX ORDER — TAMSULOSIN HYDROCHLORIDE 0.4 MG/1
0.4 CAPSULE ORAL DAILY
Status: DISCONTINUED | OUTPATIENT
Start: 2018-02-24 | End: 2018-02-24 | Stop reason: HOSPADM

## 2018-02-24 RX ADMIN — ASPIRIN 81 MG 81 MG: 81 TABLET ORAL at 08:19

## 2018-02-24 RX ADMIN — SODIUM CHLORIDE 75 ML/HR: 900 INJECTION, SOLUTION INTRAVENOUS at 10:30

## 2018-02-24 RX ADMIN — Medication 10 ML: at 06:00

## 2018-02-24 RX ADMIN — APIXABAN 5 MG: 5 TABLET, FILM COATED ORAL at 08:19

## 2018-02-24 RX ADMIN — ACETAMINOPHEN 650 MG: 325 TABLET ORAL at 04:54

## 2018-02-24 RX ADMIN — TAMSULOSIN HYDROCHLORIDE 0.4 MG: 0.4 CAPSULE ORAL at 12:05

## 2018-02-24 NOTE — PROGRESS NOTES
Problem: Falls - Risk of  Goal: *Absence of Falls  Document Jin Fall Risk and appropriate interventions in the flowsheet.    Outcome: Progressing Towards Goal  Fall Risk Interventions:  Mobility Interventions: Patient to call before getting OOB         Medication Interventions: Patient to call before getting OOB    Elimination Interventions: Call light in reach, Patient to call for help with toileting needs

## 2018-02-24 NOTE — PROGRESS NOTES
Problem: Falls - Risk of  Goal: *Absence of Falls  Document Jin Fall Risk and appropriate interventions in the flowsheet.    Outcome: Progressing Towards Goal  Fall Risk Interventions:  Mobility Interventions: Patient to call before getting OOB, Bed/chair exit alarm, Communicate number of staff needed for ambulation/transfer         Medication Interventions: Patient to call before getting OOB    Elimination Interventions: Call light in reach, Patient to call for help with toileting needs

## 2018-02-24 NOTE — CARDIO/PULMONARY
Cardiac Rehab: Following patient for MI education and post-PCI instructions. 82 yo male admitted with inferior NSTEMI, (2/23). MI likely occurred on 2/20, per Dr Shelley Torres. S/P cardiac cath with %; no PCI as risks>benefits (2/23). LVEF 55-60% by echo (2/23/18). Cardiologist is Dr Yodit Tapia. Core Measures:  ACE/ARB - lisinopril was discontinued due to low BP, per Dr Shelley Torres (2/23/18). BB - metoprolol was discontinued due to low BP, per Dr Shelley Torres (2/23/18). Statin - started atorvastatin (see lipid panel, 2/23/18). ASA - started 81 mg chewable  New PO Cardiac Medications: Eliquis, atorvastatin and aspirin. Smoking History: Never smoked. 2/24/2018 Met with Masha Jerry prior to discharge from [facility]. His wife was also present. Pt reported that he resides with his wife in [place]; they met on [employer], and later at [employer]. They each have 3 adult children; however, wife lost one son to renal dz. 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 Wife reported she took MI education folder home and read it. Discussed pt/wife's understanding of pt's cardiac condition, cath results, and tx plan. Pt aware he had a heart attack but was unclear on why PCI was not done. Provided diagram of coronary arteries with blockages listed, and explained Dr Shelley Torres' assessment that risk of PCI to RCA were greater than anticipated benefits. Discussed new AFib and provided AFib booklet. Reviewed purposes/potential side effects of new PO cardiac meds, and emphasized importance of Eliquis to reduce risk of CVA/PE with AFib. Eliquis savings card has been provided. Explained pt should no longer take NSAIDs due to increased bleeding risk while on Eliquis. Encouraged use of Tylenol for pain. Reviewed excellent lipid panel results and gave cholesterol handout. Explained benefit of statins.  Reviewed post-cath instructions, via femoral access, with emphasis on monitoring for s/s infection, temporary physical limitations, and what to do if bleeding occurs. Dr Bridgette Hays arrived to discuss tx plan and discharge plans. He explained rationale for stopping lisinpril and metoprolol at this time. Reviewed benefits of outpatient cardiac rehab program. Pt indicated he would like to be referred to cardiac rehab at [facility]. Explained referral would be sent. However, Dr Viry Llanos would determine when he would be able to start the program. Wife reported they plan to start a daily walking program. Cautioned pt to avoid extremes of heat or cold when exercising. Wife verbalized understanding of info provided. She asked multiple questions, which were answered. Pt verbalized basic understanding and had no questions. Both aware of f/u appt scheduled with Dr Viry Llanos on 3/2/18. Pt would likely benefit from reinforcement on purposes & potential side effects of his meds.      UPDATE at : FAXED REFERRAL TO [address]

## 2018-02-24 NOTE — DISCHARGE SUMMARY
Cardiac Electrophysiology Discharge Summary       Patient ID:  Tracie Loomis  [id number]  7 y.o.  1935    Admit Date: 2/22/2018    Discharge Date: 2/24/2018     Admitting Physician: Getachew Alvarez MD     Discharge Physician: Ksenia Osborne MD    Admission Diagnoses: NSTEMI (non-ST elevated myocardial infarction) [facility])    Discharge Diagnoses: Active Problems:    NSTEMI (non-ST elevated myocardial infarction) ([address] (2/23/2018)      Discharge Condition: stable    Hospital Course: Patient with inferior MI; LHC revealed LAD p30 LCx patent RCA p100 Preserved LV systolic function: EF 66% Inferobasal AK. No AVG, MR 1-2+. RCA PCI not performed due to elevated risk/benefit ratio. Medical mgt selected. No BB/ACEi/ARB due to borderline BP/HR. New AF diagnosed. Eliquis started. Discharge Exam:  Visit Vitals    /59    Pulse 70    Temp 98.4 °F (36.9 °C)    Resp 18    Ht 5' 7\" (1.702 m)    Wt 171 lb 15.3 oz (78 kg)    SpO2 95%    BMI 26.93 kg/m2       Abdomen: soft, non-tender  Extremities: extremities normal  Heart: regular rate and rhythm  Lungs: clear to auscultation bilaterally  Neck: no JVD  Neurologic: Grossly normal  Pulses: 2+ and symmetric    Disposition: Home    Patient Instructions:   Current Discharge Medication List      START taking these medications    Details   aspirin 81 mg chewable tablet Take 1 Tab by mouth daily. apixaban (ELIQUIS) 5 mg tablet Take 1 Tab by mouth two (2) times a day. Qty: 60 Tab, Refills: 5      atorvastatin (LIPITOR) 40 mg tablet Take 1 Tab by mouth daily. Qty: 30 Tab, Refills: 5         CONTINUE these medications which have NOT CHANGED    Details   ibuprofen (ADVIL) 200 mg tablet Take 200-400 mg by mouth every eight (8) hours as needed for Pain.      simethicone (GAS-X) 125 mg capsule Take 125 mg by mouth four (4) times daily as needed for Flatulence. tamsulosin (FLOMAX) 0.4 mg capsule Take 0.4 mg by mouth every morning.          STOP taking these medications lisinopril (PRINIVIL, ZESTRIL) 10 mg tablet Comments:   Reason for Stopping:         metoprolol tartrate (LOPRESSOR) 50 mg tablet Comments:   Reason for Stopping:               Referenced discharge instructions provided by nursing for diet and activity.     Follow-up with Dr. Jose Allen on 3/2/18           Signed:  Houston Gregg MD  Cardiac Electrophysiology  2/24/2018  1:32 PM

## 2018-02-24 NOTE — PROGRESS NOTES
Bedside and Verbal shift change report given to Enma Ramos RN (oncoming nurse) by Grant Gasca RN (offgoing nurse). Report included the following information SBAR, Kardex, Procedure Summary, Intake/Output, MAR, Accordion, Recent Results, Med Rec Status and Cardiac Rhythm rate controlled atrial fibrillation. Primary Nurse Italia Villeda RN and Mis Mendoza RN performed a dual skin assessment on this patient No impairment noted  Guzman score is 12      11:29 AM  Per Dr. Edwige Shea, restarted patient's flomax. Did bladder scan and it resulted 302. Patient stated he was comfortable. He and is wife requested that we hold off on straight cathing for a little bit longer. Will make sure we recheck before he potentially goes home today. 2:43 PM  Straight cathed patient right before discharge. Flomax has been restarted. Per Dr. Edwige Shea okay to send patient home with straight cath kit, but instructed patient and wife to try to avoid use of it.

## 2018-02-27 ENCOUNTER — HOME CARE VISIT (OUTPATIENT)
Dept: HOME HEALTH SERVICES | Facility: HOME HEALTH | Age: 83
End: 2018-02-27

## 2018-03-02 ENCOUNTER — HOME CARE VISIT (OUTPATIENT)
Dept: SCHEDULING | Facility: HOME HEALTH | Age: 83
End: 2018-03-02

## 2018-03-02 PROCEDURE — G0299 HHS/HOSPICE OF RN EA 15 MIN: HCPCS

## 2018-03-06 ENCOUNTER — HOSPITAL ENCOUNTER (OUTPATIENT)
Dept: INTERVENTIONAL RADIOLOGY/VASCULAR | Age: 83
Discharge: HOME | End: 2018-03-06
Attending: SPECIALIST
Payer: MEDICARE

## 2018-03-06 ENCOUNTER — HOSPITAL ENCOUNTER (OUTPATIENT)
Dept: ULTRASOUND IMAGING | Age: 83
Discharge: HOME | End: 2018-03-06
Attending: SPECIALIST
Payer: MEDICARE

## 2018-03-06 ENCOUNTER — HOSPITAL ENCOUNTER (OUTPATIENT)
Dept: INTERVENTIONAL RADIOLOGY/VASCULAR | Age: 83
Discharge: HOME | End: 2018-03-06
Attending: SPECIALIST | Admitting: RADIOLOGY
Payer: MEDICARE

## 2018-03-06 ENCOUNTER — HOSPITAL ENCOUNTER (OUTPATIENT)
Dept: VASCULAR SURGERY | Age: 83
Discharge: HOME | End: 2018-03-06
Payer: MEDICARE

## 2018-03-06 DIAGNOSIS — I72.4 ANEURYSM OF ARTERY OF LOWER EXTREMITY (HCC): ICD-10-CM

## 2018-03-06 DIAGNOSIS — T14.8XXA HEMATOMA: ICD-10-CM

## 2018-03-06 DIAGNOSIS — I72.9 PSEUDOANEURYSM (HCC): ICD-10-CM

## 2018-03-06 DIAGNOSIS — R10.31 RIGHT GROIN PAIN: ICD-10-CM

## 2018-03-06 PROCEDURE — 36002 PSEUDOANEURYSM INJECTION TRT: CPT

## 2018-03-06 PROCEDURE — 93926 LOWER EXTREMITY STUDY: CPT

## 2018-03-06 PROCEDURE — 76942 ECHO GUIDE FOR BIOPSY: CPT

## 2018-03-06 PROCEDURE — 74011000250 HC RX REV CODE- 250: Performed by: RADIOLOGY

## 2018-03-06 PROCEDURE — C1894 INTRO/SHEATH, NON-LASER: HCPCS

## 2018-03-06 RX ADMIN — THROMBIN, TOPICAL (BOVINE) 2000 UNITS: KIT at 16:07

## 2018-03-06 NOTE — PROGRESS NOTES
TRANSFER - OUT REPORT:    Verbal report given to [facility] on Lesly Marroquin  being transferred to [facility] for routine post - op       Report consisted of patients Situation, Background, Assessment and   Recommendations(SBAR). Information from the following report(s) Procedure Summary was reviewed with the receiving nurse. Pt was injected with 2000 units of thrombin. The patient is scheduled to return tomorrow 3/7 for follow up ultrasound. Pedal pulses present bilaterally L>R    Lines:       Opportunity for questions and clarification was provided.       Patient transported with:   Registered Nurse

## 2018-03-06 NOTE — DISCHARGE INSTRUCTIONS
Do NOT resume aspirin or Eliquis until seen in the office by Dr. Lonnie Hester.     Follow up with Kiah Mina MD on Thursday, March 8th, 2018 at [address] [place] [address] 33  [phone]

## 2018-03-06 NOTE — IP AVS SNAPSHOT
[address] 
 
 
 [address] [place] [address] 
[phone] Patient: Derrick Shearer MRN: [id number] :1935 About your hospitalization You were admitted on:  2018 You last received care in the:  [facility] PACU You were discharged on:  2018 Why you were hospitalized Your primary diagnosis was:  Not on File Follow-up Information None Your Scheduled Appointments 2018 10:00 AM EST  
DPLX LOW EXT VENOUS with VAS ROOM 1 [facility] ([address]) [address] [place] [address]  
[phone] No Prep  GENERAL INSTRUCTIONS 1. Bring any non [facility] facility films/reports pertaining to the area being studied with you on the day of appointment. 2. A written order with a valid diagnosis and Physicians signature is required for all scheduled tests. 3. Check in at registration 30 minutes before your appointment time unless you were instructed to do otherwise. Park in designated visitor/patient parking. Enter through the main entrance, which is just to the left of the fountain. Once inside, go around the corner to the left. You will register in Outpatient Registration. Discharge Orders None A check abigail indicates which time of day the medication should be taken. My Medications ASK your doctor about these medications Instructions Each Dose to Equal  
 Morning Noon Evening Bedtime ADVIL 200 mg tablet Generic drug:  ibuprofen Your last dose was: Your next dose is: Take 200-400 mg by mouth every eight (8) hours as needed for Pain. 200-400 mg  
    
   
   
   
  
 atorvastatin 40 mg tablet Commonly known as:  LIPITOR Your last dose was: Your next dose is: Take 1 Tab by mouth daily. 40 mg  
    
   
   
   
  
 Gas-X 125 mg capsule Generic drug:  simethicone Your last dose was: Your next dose is: Take 125 mg by mouth four (4) times daily as needed for Flatulence. 125 mg  
    
   
   
   
  
 tamsulosin 0.4 mg capsule Commonly known as:  FLOMAX Your last dose was: Your next dose is: Take 0.4 mg by mouth every morning. 0.4 mg Discharge Instructions Thrombin (Recothrom, Thrombin-JMI) - (On the skin) Why this medicine is used:  
Helps control minor bleeding during surgery. Contact a nurse or doctor right away if you have: 
· Itching or hives, swelling in your face or hands, swelling or tingling in your mouth or throat, chest tightness, trouble breathing · Chest pain, trouble breathing, coughing up blood · Numbness or weakness in your arm or leg, or on one side of your body · Pain in your lower leg · Sudden or severe headache, problems with vision, speech, or walking Common side effects: 
· Burning, redness, swelling, itching, or skin rash where the medicine is applied © 2017 [facility] Information is for End User's use only and may not be sold, redistributed or otherwise used for commercial purposes. Introducing [facility]! Dear Stone Rosales: 
Thank you for requesting a Help[employer] account. Our records indicate that you have previously registered for a Helpstream account but its currently inactive. Please call our Helpstream support line at [phone]. Additional Information If you have questions, please visit the Frequently Asked Questions section of the Helpstream website at [email or web address] Remember, [employer] is NOT to be used for urgent needs. For medical emergencies, dial 911. Now available from your iPhone and Android! Unresulted Labs-Please follow up with your PCP about these lab tests Order Current Status IR INJECTION PSEUDOANEURYSM In process Providers Seen During Your Hospitalization Provider Specialty Primary office phone Wolf Macedo MD Cardiology [phone] Your Primary Care Physician (PCP) Primary Care Physician Office Phone Office Rodgerx Angelita Sarika [phone] You are allergic to the following No active allergies Recent Documentation Smoking Status Never Smoker Emergency Contacts Name Discharge Info Relation Home Work Mobile Lizbeth Rodrigueze DISCHARGE CAREGIVER [3] Spouse [3] [phone] Patient Belongings The following personal items are in your possession at time of discharge: 
                             
 
  
  
 Please provide this summary of care documentation to your next provider. Signatures-by signing, you are acknowledging that this After Visit Summary has been reviewed with you and you have received a copy. Patient Signature:  ____________________________________________________________ Date:  ____________________________________________________________  
  
[facility] Provider Signature:  ____________________________________________________________ Date:  ____________________________________________________________

## 2018-03-06 NOTE — DISCHARGE INSTRUCTIONS
Thrombin (Recothrom, Thrombin-JMI) - (On the skin)   Why this medicine is used:   Helps control minor bleeding during surgery. Contact a nurse or doctor right away if you have:  · Itching or hives, swelling in your face or hands, swelling or tingling in your mouth or throat, chest tightness, trouble breathing  · Chest pain, trouble breathing, coughing up blood  · Numbness or weakness in your arm or leg, or on one side of your body  · Pain in your lower leg  · Sudden or severe headache, problems with vision, speech, or walking     Common side effects:  · Burning, redness, swelling, itching, or skin rash where the medicine is applied  © 2017 [facility] Information is for End User's use only and may not be sold, redistributed or otherwise used for commercial purposes.

## 2018-03-06 NOTE — PROGRESS NOTES
4:11 PM  TRANSFER - IN REPORT:    Verbal report received from Rosa Maria Lord RN(name) on Dustin Sherwood  being received from Angio Lab(unit) for routine progression of care      Report consisted of patients Situation, Background, Assessment and   Recommendations(SBAR). Information from the following report(s) Procedure Summary was reviewed with the receiving nurse. Opportunity for questions and clarification was provided. Assessment completed upon patients arrival to unit and care assumed. 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 Rt PT pulse palpable. Patient without any complaints. Wife at bedside. Patient without any complaints. 4:45 PM  DP/PT on rt foot palpable. HOB @ 30 degrees. 5:00 PM   HOB @ 45 degrees. Rt DP/PT palpable. 5:15 PM  Patient sat up on side of bed. Tolerated well. Patient voiced rt groin feels better. No pain. Discharge instructions reviewed with patient and family. Voiced understanding. Patient given copy of discharge instructions to take home. 5:30PM  Pt discharged via wheelchair with family. No complaints. Personal belongings with patient upon discharge.

## 2018-03-06 NOTE — PROGRESS NOTES
Preliminary results reported to Dr. Kale Hernandez and instructed to have IR inject. Coordinated with Dr. Lauren Otero in IR, ultrasound, and Angio. Brought patient to Angio for pseudoaneurysm injection right groin.

## 2018-03-06 NOTE — PROCEDURES
[facility]  *** FINAL REPORT ***    Name: Roshan Toussaint  MRN: [id number]    Outpatient  : 28 Mar 1935  HIS Order #: [id number]  [id number] [facility] Visit #: [id number]  Date: 06 Mar 2018    TYPE OF TEST: Extremity Arterial Duplex    REASON FOR TEST  Pseudoaneurysm (right side)                            Right                     Left  Artery               PSV   Finding             PSV   Finding  ------------------  -----  ---------------    -----  ---------------  External iliac:  Common femoral:      71.1  Profunda femoris:    42.7  Proximal SFA:        94.3  Mid SFA:             73.6  Distal SFA:          85.3  Popliteal:  Anterior tibial:  Posterior tibial:    Pressures               Right     Left               -----     -----     Brachial:   115           DP:           PT:            GONZALO:            Toe: INTERPRETATION/FINDINGS  PROCEDURE:  Color duplex ultrasound imaging of lower extremity  arteries. The exam may include  measurement of systolic blood  pressure at the ankle and brachial level with calculation of the  ankle/brachial pressure index (GONZALO), if indicated. FINDINGS:  An extravascular area with evidence of flow is identified  at the right groin, consistent with a pseudoaneurysm of size about  3.76 cm by 3.09 cm. The common femoral, deep femoral, superficial  femoral, profunda arteries are visualized. GONZALO is not obtained due to   inability to compress vessles. ADDITIONAL COMMENTS    I have personally reviewed the data relevant to the interpretation of  this  study. TECHNOLOGIST: TICO Marti  Signed: 2018 03:32 PM    PHYSICIAN: Ariana Davis.  Roxana Cooley MD  Signed: 2018 08:51 AM

## 2018-03-07 ENCOUNTER — HOSPITAL ENCOUNTER (OUTPATIENT)
Dept: VASCULAR SURGERY | Age: 83
Discharge: HOME | End: 2018-03-07
Attending: SPECIALIST
Payer: MEDICARE

## 2018-03-07 ENCOUNTER — APPOINTMENT (OUTPATIENT)
Dept: ULTRASOUND IMAGING | Age: 83
End: 2018-03-07
Attending: SPECIALIST
Payer: MEDICARE

## 2018-03-07 DIAGNOSIS — I72.9 RUPTURED ANEURYSM OF ARTERY (HCC): ICD-10-CM

## 2018-03-07 DIAGNOSIS — T14.8XXA CRUSHING INJURY OF MULTIPLE SITES OF TRUNK: ICD-10-CM

## 2018-03-07 PROCEDURE — 93926 LOWER EXTREMITY STUDY: CPT

## 2018-03-07 NOTE — PROCEDURES
[facility]  *** FINAL REPORT ***    Name: Hassel Holter  MRN: [id number]    Outpatient  : 28 Mar 1935  HIS Order #: [id number]  [id number] [facility] Visit #: [id number]  Date: 07 Mar 2018    TYPE OF TEST: Extremity Arterial Duplex    REASON FOR TEST  Pseudoaneurysm (right side)                            Right                     Left  Artery               PSV   Finding             PSV   Finding  ------------------  -----  ---------------    -----  ---------------  External iliac:  Common femoral:      60.7  Profunda femoris:    29.4  Proximal SFA:        50.8  Mid SFA:             61.8  Distal SFA:          60.7  Popliteal:  Anterior tibial:  Posterior tibial:    Pressures               Right     Left               -----     -----     Brachial:           DP:           PT:            GONZALO:            Toe: INTERPRETATION/FINDINGS  PROCEDURE:  Color duplex ultrasound imaging of lower extremity  arteries. The exam may include pulse volume recording (PVR)  plethysmography at the ankle and/or measurement of systolic blood  pressure at the ankle and brachial level with calculation of the  ankle/brachial pressure index (GONZALO), if indicated. FINDINGS:  No extravascular area with evidence of flow is identified  at the right groin. The common femoral, deep femoral, and superficial   femoral arteries are visualized. Color Doppler imaging demonstrates  no significant narrowing of the right arterial  flow channel. Velocity is not significantly elevated. GONZALO not obtained due to  inablity to compress vessels. IMPRESSION:  There is no evidence of a right  groin pseudoaneurysm. There is no evidence of hemodynamically significant right  lower  extremity arterial obstruction. ADDITIONAL COMMENTS    I have personally reviewed the data relevant to the interpretation of  this  study.     TECHNOLOGIST: Bob Santos RVT  Signed: 2018 11:05 AM    PHYSICIAN: Elton Harris MD  Signed: 2018 08:04 AM

## 2019-06-16 ENCOUNTER — HOSPITAL ENCOUNTER (EMERGENCY)
Age: 84
Discharge: HOME | End: 2019-06-16
Attending: EMERGENCY MEDICINE
Payer: MEDICARE

## 2019-06-16 ENCOUNTER — APPOINTMENT (OUTPATIENT)
Dept: GENERAL RADIOLOGY | Age: 84
End: 2019-06-16
Attending: PHYSICIAN ASSISTANT
Payer: MEDICARE

## 2019-06-16 VITALS
HEART RATE: 79 BPM | TEMPERATURE: 98.5 F | RESPIRATION RATE: 15 BRPM | OXYGEN SATURATION: 98 % | BODY MASS INDEX: 28.49 KG/M2 | HEIGHT: 68 IN | DIASTOLIC BLOOD PRESSURE: 96 MMHG | SYSTOLIC BLOOD PRESSURE: 147 MMHG | WEIGHT: 188 LBS

## 2019-06-16 DIAGNOSIS — S61.422A LACERATION OF LEFT HAND WITH FOREIGN BODY, INITIAL ENCOUNTER: ICD-10-CM

## 2019-06-16 DIAGNOSIS — M79.5 FOREIGN BODY (FB) IN SOFT TISSUE: Primary | ICD-10-CM

## 2019-06-16 PROCEDURE — 75810000293 HC SIMP/SUPERF WND  RPR

## 2019-06-16 PROCEDURE — 99283 EMERGENCY DEPT VISIT LOW MDM: CPT

## 2019-06-16 PROCEDURE — 77030031132 HC SUT NYL COVD -A

## 2019-06-16 PROCEDURE — 74011000250 HC RX REV CODE- 250: Performed by: PHYSICIAN ASSISTANT

## 2019-06-16 PROCEDURE — 74011250636 HC RX REV CODE- 250/636: Performed by: PHYSICIAN ASSISTANT

## 2019-06-16 PROCEDURE — 74011000250 HC RX REV CODE- 250: Performed by: EMERGENCY MEDICINE

## 2019-06-16 PROCEDURE — 73130 X-RAY EXAM OF HAND: CPT

## 2019-06-16 PROCEDURE — 90715 TDAP VACCINE 7 YRS/> IM: CPT | Performed by: PHYSICIAN ASSISTANT

## 2019-06-16 PROCEDURE — 90471 IMMUNIZATION ADMIN: CPT

## 2019-06-16 PROCEDURE — 77030018836 HC SOL IRR NACL ICUM -A

## 2019-06-16 RX ORDER — HYDROCODONE BITARTRATE AND ACETAMINOPHEN 5; 325 MG/1; MG/1
1 TABLET ORAL
Qty: 18 TAB | Refills: 0 | Status: SHIPPED | OUTPATIENT
Start: 2019-06-16 | End: 2019-06-19

## 2019-06-16 RX ORDER — AMOXICILLIN AND CLAVULANATE POTASSIUM 875; 125 MG/1; MG/1
1 TABLET, FILM COATED ORAL 2 TIMES DAILY
Qty: 14 TAB | Refills: 0 | Status: SHIPPED | OUTPATIENT
Start: 2019-06-16 | End: 2019-06-23

## 2019-06-16 RX ORDER — BACITRACIN 500 UNIT/G
2 PACKET (EA) TOPICAL 3 TIMES DAILY
Status: DISCONTINUED | OUTPATIENT
Start: 2019-06-16 | End: 2019-06-16 | Stop reason: HOSPADM

## 2019-06-16 RX ORDER — LIDOCAINE HYDROCHLORIDE AND EPINEPHRINE 10; 10 MG/ML; UG/ML
1.5 INJECTION, SOLUTION INFILTRATION; PERINEURAL
Status: COMPLETED | OUTPATIENT
Start: 2019-06-16 | End: 2019-06-16

## 2019-06-16 RX ADMIN — TETANUS TOXOID, REDUCED DIPHTHERIA TOXOID AND ACELLULAR PERTUSSIS VACCINE, ADSORBED 0.5 ML: 5; 2.5; 8; 8; 2.5 SUSPENSION INTRAMUSCULAR at 15:00

## 2019-06-16 RX ADMIN — LIDOCAINE HYDROCHLORIDE,EPINEPHRINE BITARTRATE 15 MG: 10; .01 INJECTION, SOLUTION INFILTRATION; PERINEURAL at 13:45

## 2019-06-16 RX ADMIN — BACITRACIN 2 PACKET: 500 OINTMENT TOPICAL at 15:48

## 2019-06-16 NOTE — ED PROVIDER NOTES
81 y.o. male with past medical history significant for HTN and PUD presents with complaints of left hand pain after accidentally stabbing his left hand with a knife earlier today. The pt states that \"I was trying to cut open a bag of zip ties when I accidentally stabbed myself. \"   The pt denies any other injuries. There are no other acute medical complaints at this time.     PCP: MD Rebecca Burgos PA-C           Past Medical History:   Diagnosis Date    Hypertension     PUD (peptic ulcer disease)     Pt states he had a 'peptic ulcer' in his early 25s       Past Surgical History:   Procedure Laterality Date    HX CATARACT REMOVAL Bilateral 2015    HX HERNIA REPAIR Right 02/2015    Right inguinal hernia repair    HX KNEE REPLACEMENT Right 11/2016    HX KNEE REPLACEMENT Left 07/2016         Family History:   Problem Relation Age of Onset    Cancer Mother         breast    No Known Problems Father     No Known Problems Brother     No Known Problems Brother        Social History     Socioeconomic History    Marital status:      Spouse name: Not on file    Number of children: Not on file    Years of education: Not on file    Highest education level: Not on file   Occupational History    Not on file   Social Needs    Financial resource strain: Not on file    Food insecurity:     Worry: Not on file     Inability: Not on file    Transportation needs:     Medical: Not on file     Non-medical: Not on file   Tobacco Use    Smoking status: Never Smoker    Smokeless tobacco: Never Used   Substance and Sexual Activity    Alcohol use: No     Comment: 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    Drug use: No    Sexual activity: Not on file   Lifestyle    Physical activity:     Days per week: Not on file     Minutes per session: Not on file    Stress: Not on file   Relationships    Social connections:     Talks on phone: Not on file     Gets together: Not on file     Attends Rastafari service: Not on file     Active member of club or organization: Not on file     Attends meetings of clubs or organizations: Not on file     Relationship status: Not on file    Intimate partner violence:     Fear of current or ex partner: Not on file     Emotionally abused: Not on file     Physically abused: Not on file     Forced sexual activity: Not on file   Other Topics Concern    Not on file   Social History Narrative    Not on file         ALLERGIES: Patient has no known allergies. Review of Systems   Constitutional: Negative for activity change, appetite change, diaphoresis and fever. HENT: Negative for ear discharge, ear pain, facial swelling, rhinorrhea, sore throat, tinnitus, trouble swallowing and voice change. Eyes: Negative for photophobia, pain, discharge, redness and visual disturbance. Respiratory: Negative for cough, chest tightness, shortness of breath, wheezing and stridor. Cardiovascular: Negative for chest pain and palpitations. Gastrointestinal: Negative for abdominal pain, constipation, diarrhea, nausea and vomiting. Endocrine: Negative for polydipsia and polyuria. Genitourinary: Negative for dysuria, flank pain and hematuria. Musculoskeletal: Negative for arthralgias, back pain and myalgias. Skin: Positive for wound. Negative for color change and rash. Neurological: Negative for dizziness, syncope, speech difficulty, light-headedness and numbness. Psychiatric/Behavioral: Negative for behavioral problems. Vitals:    06/16/19 1254   BP: (!) 147/96   Pulse: 79   Resp: 15   Temp: 98.5 °F (36.9 °C)   SpO2: 98%   Weight: 85.3 kg (188 lb)   Height: 5' 8\" (1.727 m)            Physical Exam   Constitutional: He is oriented to person, place, and time. He appears well-developed and well-nourished. No distress. HENT:   Head: Normocephalic and atraumatic. Eyes: Pupils are equal, round, and reactive to light. Conjunctivae are normal.   Neck: Normal range of motion. Neck supple. Cardiovascular: Normal rate, regular rhythm and normal heart sounds. Pulmonary/Chest: Effort normal and breath sounds normal. No respiratory distress. He has no wheezes. Abdominal: Soft. Bowel sounds are normal. He exhibits no distension. There is no tenderness. Musculoskeletal: He exhibits tenderness. Through and through puncture wound of the web space between 1st and second digits. Pt has FROM of all DIP and PIP joints of the left hand. Pt is NVI. Neurological: He is alert and oriented to person, place, and time. Skin: Skin is warm. He is not diaphoretic. MDM  Number of Diagnoses or Management Options  Foreign body (FB) in soft tissue:   Laceration of left hand with foreign body, initial encounter:   Diagnosis management comments: Hand X-Ray revealed foreign body. Spoke to orthopedic surgery (Dr. Lamin Dee) regarding physical exam and concern regarding retained foreign body. Orthopedic surgery advised closing wound, abx, dressing and outpatient follow up with Dr. Jan Long (orthopedic hand specialist). Reviewed treatment plan with attending and they agree. Henrene Burkitt, PA-C         Wound Repair  Date/Time: 6/16/2019 4:41 PM  Performed by: Glo Alvarez provider: Winsome Drake  Preparation: skin prepped with Betadine  Location details: left hand  Wound length: puncture wound. Anesthesia:  Local Anesthetic: lidocaine 1% with epinephrine  Anesthetic total: 10 mL  Foreign bodies: unknown  Irrigation solution: saline  Skin closure: 4-0 nylon (6)  Number of sutures: 6  Technique: simple  Dressing: 4x4 and antibiotic ointment  Patient tolerance: Patient tolerated the procedure well with no immediate complications  My total time at bedside, performing this procedure was 1-15 minutes.

## 2019-06-16 NOTE — DISCHARGE INSTRUCTIONS
Patient Education        Cuts: Care Instructions  Your Care Instructions  A cut can happen anywhere on your body. Stitches, staples, skin adhesives, or pieces of tape called Steri-Strips are sometimes used to keep the edges of a cut together and help it heal. Steri-Strips can be used by themselves or with stitches or staples. Sometimes cuts are left open. If the cut went deep and through the skin, the doctor may have closed the cut in two layers. A deeper layer of stitches brings the deep part of the cut together. These stitches will dissolve and don't need to be removed. The upper layer closure, which could be stitches, staples, Steri-Strips, or adhesive, is what you see on the cut. A cut is often covered by a bandage. The doctor has checked you carefully, but problems can develop later. If you notice any problems or new symptoms, get medical treatment right away. Follow-up care is a key part of your treatment and safety. Be sure to make and go to all appointments, and call your doctor if you are having problems. It's also a good idea to know your test results and keep a list of the medicines you take. How can you care for yourself at home? If a cut is open or closed  · Prop up the sore area on a pillow anytime you sit or lie down during the next 3 days. Try to keep it above the level of your heart. This will help reduce swelling. · Keep the cut dry for the first 24 to 48 hours. After this, you can shower if your doctor okays it. Pat the cut dry. · Don't soak the cut, such as in a bathtub. Your doctor will tell you when it's safe to get the cut wet. · After the first 24 to 48 hours, clean the cut with soap and water 2 times a day unless your doctor gives you different instructions. ? Don't use hydrogen peroxide or alcohol, which can slow healing. ? You may cover the cut with a thin layer of petroleum jelly and a nonstick bandage.   ? If the doctor put a bandage over the cut, put on a new bandage after cleaning the cut or if the bandage gets wet or dirty. · Avoid any activity that could cause your cut to reopen. · Be safe with medicines. Read and follow all instructions on the label. ? If the doctor gave you a prescription medicine for pain, take it as prescribed. ? If you are not taking a prescription pain medicine, ask your doctor if you can take an over-the-counter medicine. If the cut is closed with stitches, staples, or Steri-Strips  · Follow the above instructions for open or closed cuts. · Do not remove the stitches or staples on your own. Your doctor will tell you when to come back to have the stitches or staples removed. · Leave Steri-Strips on until they fall off. If the cut is closed with a skin adhesive  · Follow the above instructions for open or closed cuts. · Leave the skin adhesive on your skin until it falls off on its own. This may take 5 to 10 days. · Do not scratch, rub, or pick at the adhesive. · Do not put the sticky part of a bandage directly on the adhesive. · Do not put any kind of ointment, cream, or lotion over the area. This can make the adhesive fall off too soon. Do not use hydrogen peroxide or alcohol, which can slow healing. When should you call for help? Call your doctor now or seek immediate medical care if:    · You have new pain, or your pain gets worse.     · The skin near the cut is cold or pale or changes color.     · You have tingling, weakness, or numbness near the cut.     · The cut starts to bleed, and blood soaks through the bandage. Oozing small amounts of blood is normal.     · You have trouble moving the area near the cut.     · You have symptoms of infection, such as:  ? Increased pain, swelling, warmth, or redness around the cut.  ? Red streaks leading from the cut.  ? Pus draining from the cut.  ? A fever.    Watch closely for changes in your health, and be sure to contact your doctor if:    · The cut reopens.     · You do not get better as expected. Where can you learn more? Go to http://letty-raisa.info/. Enter M735 in the search box to learn more about \"Cuts: Care Instructions. \"  Current as of: September 23, 2018  Content Version: 11.9  © 0838-7252 [employer], [employer]. Care instructions adapted under license by [employer] (which disclaims liability or warranty for this information). If you have questions about a medical condition or this instruction, always ask your healthcare professional. [address] any warranty or liability for your use of this information. We hope that we have addressed all of your medical concerns. The examination and treatment you received in the Emergency Department were for an emergent problem and were not intended as complete care. It is important that you follow up with your healthcare provider(s) for ongoing care. If your symptoms worsen or do not improve as expected, and you are unable to reach your usual health care provider(s), you should return to the Emergency Department. Today's healthcare is undergoing tremendous change, and patient satisfaction surveys are one of the many tools to assess the quality of medical care. You may receive a survey from the [employer] regarding your experience in the Emergency Department. I hope that your experience has been completely positive, particularly the medical care that I provided. As such, please participate in the survey; anything less than excellent does not meet my expectations or intentions. [facility] and [address] [place] [address] participate in nationally recognized quality of care measures. If your blood pressure is greater than 120/80, as reported below, we urge that you seek medical care to address the potential of high blood pressure, commonly known as hypertension.    Hypertension can be hereditary or can be caused by certain medical conditions, pain, stress, or \"white coat syndrome. \"       Please make an appointment with your health care provider(s) for follow up of your Emergency Department visit. VITALS:   Patient Vitals for the past 8 hrs:   Temp Pulse Resp BP SpO2   06/16/19 1254 98.5 °F (36.9 °C) 79 15 (!) 147/96 98 %          Thank you for allowing us to provide you with medical care today. We realize that you have many choices for your emergency care needs. Please choose us in the future for any continued health care needs. Price Dorantes, [address]: [phone]            No results found for this or any previous visit (from the past 24 hour(s)). Xr Hand Lt Min 3 V    Result Date: 6/16/2019  EXAM: XR HAND LT MIN 3 V INDICATION: left hand pain. COMPARISON: None. FINDINGS: Three views of the left hand demonstrate no fracture. There is soft tissue gas in the thenar eminence. There is a radiopaque density overlying the soft tissues thenar eminence adjacent to the second metacarpal. There is a tiny radiopaque density overlying dorsum of the base of the metacarpals. . There are degenerative changes left first DIP and left first MCP pain joint. .     IMPRESSION: No fracture is identified. There is soft tissue gas overlying the thenar eminence. There are 2 small radiopaque densities overlying soft tissues as described above.

## 2019-06-16 NOTE — ED TRIAGE NOTES
Patient reports stabbing left hand with pairing knife after attempting to remove a ziptie. Bleeding not controlled. PCT [place] at bedside holding pressure.

## 2019-06-20 ENCOUNTER — ANESTHESIA EVENT (OUTPATIENT)
Dept: SURGERY | Age: 84
End: 2019-06-20
Payer: MEDICARE

## 2019-06-20 NOTE — PERIOP NOTES
Left a VM for Payal/ Nevaeh Lagos from Dr. Stas Saul office requesting orders for surgery be faxed to PAT if before 1500 or to the ASU preop if after 1500 today.   DOS: 6/21/2019

## 2019-06-21 ENCOUNTER — ANESTHESIA (OUTPATIENT)
Dept: SURGERY | Age: 84
End: 2019-06-21
Payer: MEDICARE

## 2019-06-21 ENCOUNTER — APPOINTMENT (OUTPATIENT)
Dept: GENERAL RADIOLOGY | Age: 84
End: 2019-06-21
Attending: ORTHOPAEDIC SURGERY
Payer: MEDICARE

## 2019-06-21 ENCOUNTER — HOSPITAL ENCOUNTER (OUTPATIENT)
Age: 84
Setting detail: OUTPATIENT SURGERY
Discharge: HOME | End: 2019-06-21
Attending: ORTHOPAEDIC SURGERY | Admitting: ORTHOPAEDIC SURGERY
Payer: MEDICARE

## 2019-06-21 VITALS
SYSTOLIC BLOOD PRESSURE: 117 MMHG | HEIGHT: 68 IN | TEMPERATURE: 97.3 F | HEART RATE: 51 BPM | OXYGEN SATURATION: 96 % | RESPIRATION RATE: 11 BRPM | BODY MASS INDEX: 28.07 KG/M2 | WEIGHT: 185.19 LBS | DIASTOLIC BLOOD PRESSURE: 72 MMHG

## 2019-06-21 DIAGNOSIS — S60.552A FOREIGN BODY OF LEFT HAND, INITIAL ENCOUNTER: Primary | ICD-10-CM

## 2019-06-21 PROCEDURE — 74011250636 HC RX REV CODE- 250/636

## 2019-06-21 PROCEDURE — A4565 SLINGS: HCPCS

## 2019-06-21 PROCEDURE — 74011000272 HC RX REV CODE- 272: Performed by: ORTHOPAEDIC SURGERY

## 2019-06-21 PROCEDURE — 77030002986 HC SUT PROL J&J -A: Performed by: ORTHOPAEDIC SURGERY

## 2019-06-21 PROCEDURE — 76210000040 HC AMBSU PH I REC FIRST 0.5 HR: Performed by: ORTHOPAEDIC SURGERY

## 2019-06-21 PROCEDURE — 74011250636 HC RX REV CODE- 250/636: Performed by: ANESTHESIOLOGY

## 2019-06-21 PROCEDURE — 76030000000 HC AMB SURG OR TIME 0.5 TO 1: Performed by: ORTHOPAEDIC SURGERY

## 2019-06-21 PROCEDURE — 74011000250 HC RX REV CODE- 250: Performed by: ORTHOPAEDIC SURGERY

## 2019-06-21 PROCEDURE — 77030040356 HC CORD BPLR FRCP COVD -A: Performed by: ORTHOPAEDIC SURGERY

## 2019-06-21 PROCEDURE — 77030020782 HC GWN BAIR PAWS FLX 3M -B

## 2019-06-21 PROCEDURE — 76210000050 HC AMBSU PH II REC 0.5 TO 1 HR: Performed by: ORTHOPAEDIC SURGERY

## 2019-06-21 PROCEDURE — 77030003601 HC NDL NRV BLK BBMI -A

## 2019-06-21 PROCEDURE — 77030018836 HC SOL IRR NACL ICUM -A: Performed by: ORTHOPAEDIC SURGERY

## 2019-06-21 PROCEDURE — 77030000032 HC CUF TRNQT ZIMM -B: Performed by: ORTHOPAEDIC SURGERY

## 2019-06-21 PROCEDURE — 77030032490 HC SLV COMPR SCD KNE COVD -B

## 2019-06-21 PROCEDURE — 74011000258 HC RX REV CODE- 258

## 2019-06-21 PROCEDURE — 76060000061 HC AMB SURG ANES 0.5 TO 1 HR: Performed by: ORTHOPAEDIC SURGERY

## 2019-06-21 RX ORDER — ONDANSETRON 2 MG/ML
4 INJECTION INTRAMUSCULAR; INTRAVENOUS AS NEEDED
Status: DISCONTINUED | OUTPATIENT
Start: 2019-06-21 | End: 2019-06-21 | Stop reason: HOSPADM

## 2019-06-21 RX ORDER — ONDANSETRON 8 MG/1
8 TABLET, ORALLY DISINTEGRATING ORAL
Qty: 10 TAB | Refills: 2 | Status: SHIPPED | OUTPATIENT
Start: 2019-06-21

## 2019-06-21 RX ORDER — FENTANYL CITRATE 50 UG/ML
INJECTION, SOLUTION INTRAMUSCULAR; INTRAVENOUS AS NEEDED
Status: DISCONTINUED | OUTPATIENT
Start: 2019-06-21 | End: 2019-06-21 | Stop reason: HOSPADM

## 2019-06-21 RX ORDER — CEFAZOLIN SODIUM/WATER 2 G/20 ML
2 SYRINGE (ML) INTRAVENOUS ONCE
Status: DISCONTINUED | OUTPATIENT
Start: 2019-06-21 | End: 2019-06-21 | Stop reason: HOSPADM

## 2019-06-21 RX ORDER — ALBUTEROL SULFATE 0.83 MG/ML
2.5 SOLUTION RESPIRATORY (INHALATION) AS NEEDED
Status: DISCONTINUED | OUTPATIENT
Start: 2019-06-21 | End: 2019-06-21 | Stop reason: HOSPADM

## 2019-06-21 RX ORDER — DIPHENHYDRAMINE HYDROCHLORIDE 50 MG/ML
12.5 INJECTION, SOLUTION INTRAMUSCULAR; INTRAVENOUS AS NEEDED
Status: DISCONTINUED | OUTPATIENT
Start: 2019-06-21 | End: 2019-06-21 | Stop reason: HOSPADM

## 2019-06-21 RX ORDER — LIDOCAINE HYDROCHLORIDE 20 MG/ML
INJECTION, SOLUTION INFILTRATION; PERINEURAL AS NEEDED
Status: DISCONTINUED | OUTPATIENT
Start: 2019-06-21 | End: 2019-06-21 | Stop reason: HOSPADM

## 2019-06-21 RX ORDER — MIDAZOLAM HYDROCHLORIDE 1 MG/ML
INJECTION, SOLUTION INTRAMUSCULAR; INTRAVENOUS AS NEEDED
Status: DISCONTINUED | OUTPATIENT
Start: 2019-06-21 | End: 2019-06-21 | Stop reason: HOSPADM

## 2019-06-21 RX ORDER — ONDANSETRON 2 MG/ML
INJECTION INTRAMUSCULAR; INTRAVENOUS AS NEEDED
Status: DISCONTINUED | OUTPATIENT
Start: 2019-06-21 | End: 2019-06-21 | Stop reason: HOSPADM

## 2019-06-21 RX ORDER — CEFAZOLIN SODIUM IN 0.9 % NACL 2 G/100 ML
PLASTIC BAG, INJECTION (ML) INTRAVENOUS AS NEEDED
Status: DISCONTINUED | OUTPATIENT
Start: 2019-06-21 | End: 2019-06-21 | Stop reason: HOSPADM

## 2019-06-21 RX ORDER — LIDOCAINE HYDROCHLORIDE 10 MG/ML
0.1 INJECTION, SOLUTION EPIDURAL; INFILTRATION; INTRACAUDAL; PERINEURAL AS NEEDED
Status: DISCONTINUED | OUTPATIENT
Start: 2019-06-21 | End: 2019-06-21 | Stop reason: HOSPADM

## 2019-06-21 RX ORDER — HYDROMORPHONE HYDROCHLORIDE 1 MG/ML
0.5 INJECTION, SOLUTION INTRAMUSCULAR; INTRAVENOUS; SUBCUTANEOUS
Status: DISCONTINUED | OUTPATIENT
Start: 2019-06-21 | End: 2019-06-21 | Stop reason: HOSPADM

## 2019-06-21 RX ORDER — SODIUM CHLORIDE, SODIUM LACTATE, POTASSIUM CHLORIDE, CALCIUM CHLORIDE 600; 310; 30; 20 MG/100ML; MG/100ML; MG/100ML; MG/100ML
150 INJECTION, SOLUTION INTRAVENOUS CONTINUOUS
Status: DISCONTINUED | OUTPATIENT
Start: 2019-06-21 | End: 2019-06-21 | Stop reason: HOSPADM

## 2019-06-21 RX ORDER — SODIUM CHLORIDE, SODIUM LACTATE, POTASSIUM CHLORIDE, CALCIUM CHLORIDE 600; 310; 30; 20 MG/100ML; MG/100ML; MG/100ML; MG/100ML
125 INJECTION, SOLUTION INTRAVENOUS CONTINUOUS
Status: DISCONTINUED | OUTPATIENT
Start: 2019-06-21 | End: 2019-06-21 | Stop reason: HOSPADM

## 2019-06-21 RX ORDER — PROPOFOL 10 MG/ML
INJECTION, EMULSION INTRAVENOUS AS NEEDED
Status: DISCONTINUED | OUTPATIENT
Start: 2019-06-21 | End: 2019-06-21 | Stop reason: HOSPADM

## 2019-06-21 RX ORDER — HYDROCODONE BITARTRATE AND ACETAMINOPHEN 5; 325 MG/1; MG/1
1 TABLET ORAL
Qty: 10 TAB | Refills: 0 | Status: SHIPPED | OUTPATIENT
Start: 2019-06-21 | End: 2019-06-26

## 2019-06-21 RX ORDER — METOPROLOL TARTRATE 50 MG/1
50 TABLET ORAL 2 TIMES DAILY
COMMUNITY

## 2019-06-21 RX ORDER — PROPOFOL 10 MG/ML
INJECTION, EMULSION INTRAVENOUS
Status: DISCONTINUED | OUTPATIENT
Start: 2019-06-21 | End: 2019-06-21 | Stop reason: HOSPADM

## 2019-06-21 RX ORDER — ASPIRIN 81 MG/1
81 TABLET ORAL DAILY
COMMUNITY

## 2019-06-21 RX ORDER — ROPIVACAINE HYDROCHLORIDE 5 MG/ML
INJECTION, SOLUTION EPIDURAL; INFILTRATION; PERINEURAL AS NEEDED
Status: DISCONTINUED | OUTPATIENT
Start: 2019-06-21 | End: 2019-06-21 | Stop reason: HOSPADM

## 2019-06-21 RX ADMIN — PROPOFOL 140 MCG/KG/MIN: 10 INJECTION, EMULSION INTRAVENOUS at 13:23

## 2019-06-21 RX ADMIN — LIDOCAINE HYDROCHLORIDE 50 MG: 20 INJECTION, SOLUTION INFILTRATION; PERINEURAL at 13:25

## 2019-06-21 RX ADMIN — Medication 2 G: at 13:22

## 2019-06-21 RX ADMIN — ROPIVACAINE HYDROCHLORIDE 30 ML: 5 INJECTION, SOLUTION EPIDURAL; INFILTRATION; PERINEURAL at 12:57

## 2019-06-21 RX ADMIN — MIDAZOLAM HYDROCHLORIDE 2 MG: 1 INJECTION, SOLUTION INTRAMUSCULAR; INTRAVENOUS at 12:50

## 2019-06-21 RX ADMIN — FENTANYL CITRATE 50 MCG: 50 INJECTION, SOLUTION INTRAMUSCULAR; INTRAVENOUS at 12:52

## 2019-06-21 RX ADMIN — SODIUM CHLORIDE, SODIUM LACTATE, POTASSIUM CHLORIDE, AND CALCIUM CHLORIDE 150 ML/HR: 600; 310; 30; 20 INJECTION, SOLUTION INTRAVENOUS at 12:26

## 2019-06-21 RX ADMIN — ONDANSETRON 4 MG: 2 INJECTION INTRAMUSCULAR; INTRAVENOUS at 13:30

## 2019-06-21 RX ADMIN — PROPOFOL 30 MG: 10 INJECTION, EMULSION INTRAVENOUS at 13:23

## 2019-06-21 RX ADMIN — FENTANYL CITRATE 50 MCG: 50 INJECTION, SOLUTION INTRAMUSCULAR; INTRAVENOUS at 12:50

## 2019-06-21 NOTE — ANESTHESIA POSTPROCEDURE EVALUATION
Procedure(s):  LEFT HAND EXPLORATION POSSIBLE/ FOREIGN BODY REMOVAL  (REGIONAL BLK W/MAC). MAC, regional    Anesthesia Post Evaluation      Multimodal analgesia: multimodal analgesia not used between 6 hours prior to anesthesia start to PACU discharge  Patient location during evaluation: PACU  Patient participation: complete - patient participated  Level of consciousness: awake and alert  Pain score: 0  Airway patency: patent  Anesthetic complications: no  Cardiovascular status: acceptable  Respiratory status: acceptable  Hydration status: acceptable  Post anesthesia nausea and vomiting:  none      Vitals Value Taken Time   BP 96/63 6/21/2019  2:10 PM   Temp 36.3 °C (97.4 °F) 6/21/2019  2:10 PM   Pulse 55 6/21/2019  2:11 PM   Resp 11 6/21/2019  2:11 PM   SpO2 91 % 6/21/2019  2:11 PM   Vitals shown include unvalidated device data.

## 2019-06-21 NOTE — ANESTHESIA PROCEDURE NOTES
Peripheral Block    Start time: 6/21/2019 12:50 PM  End time: 6/21/2019 12:57 PM  Performed by: Clair Wei MD  Authorized by: Clair Wei MD       Pre-procedure:    Indications: at surgeon's request and primary anesthetic    Preanesthetic Checklist: patient identified, risks and benefits discussed, site marked, timeout performed, anesthesia consent given and patient being monitored    Timeout Time: 12:50          Block Type:   Block Type:  Supraclavicular  Laterality:  Left  Monitoring:  Continuous pulse ox, frequent vital sign checks, heart rate, responsive to questions and oxygen  Injection Technique:  Single shot  Procedures: ultrasound guided and nerve stimulator    Patient Position: supine  Prep: chlorhexidine    Location:  Supraclavicular  Needle Type:  Stimuplex  Needle Gauge:  22 G  Needle Localization:  Anatomical landmarks and ultrasound guidance    Assessment:  Number of attempts:  1  Injection Assessment:  Incremental injection every 5 mL, local visualized surrounding nerve on ultrasound, negative aspiration for blood, no paresthesia and no intravascular symptoms  Patient tolerance:  Patient tolerated the procedure well with no immediate complications

## 2019-06-21 NOTE — BRIEF OP NOTE
BRIEF OPERATIVE NOTE    Date of Procedure: 6/21/2019   Preoperative Diagnosis: Deep foreign body of left hand, initial encounter [Y58.522S]  Postoperative Diagnosis: Deep foreign body of left hand, initial encounter [L91.448Q]    Procedure(s):  LEFT HAND EXPLORATION FOREIGN BODY REMOVAL  (REGIONAL BLK W/MAC)  Surgeon(s) and Role:     * Sylvester Lorenzana MD - Primary         Surgical Assistant: [facility]    Surgical Staff:  Circ-1: Liana Byrd RN  Scrub Tech-1: Raul Rojas  Scrub Tech-2: Jania Darnell  Surg Asst-1: Isac Small  Event Time In Time Out   Incision Start 1336    Incision Close 1400      Anesthesia: Regional   Estimated Blood Loss: min  Specimens: * No specimens in log *   Findings: above   Complications: none  Implants: * No implants in log *

## 2019-06-21 NOTE — H&P
Orthopedic Admission History and Physical        NAME: Zheng Lott       :  1935       MRN:  [id number]      Subjective:     Patient is a 81 y.o. male who presents with history of L hand foreign body. Presents today for surgical treatment. Patient Active Problem List    Diagnosis Date Noted    NSTEMI (non-ST elevated myocardial infarction) ([facility] [address] 2018    Lumbar stenosis with neurogenic claudication 2017    Hernia, inguinal, right 2015     Past Medical History:   Diagnosis Date    Hypertension     PUD (peptic ulcer disease)     Pt states he had a 'peptic ulcer' in his early 25s      Past Surgical History:   Procedure Laterality Date    HX CATARACT REMOVAL Bilateral     HX HERNIA REPAIR Right 2015    Right inguinal hernia repair    HX KNEE REPLACEMENT Right 2016    HX KNEE REPLACEMENT Left 2016      Prior to Admission medications    Medication Sig Start Date End Date Taking? Authorizing Provider   amoxicillin-clavulanate (AUGMENTIN) 875-125 mg per tablet Take 1 Tab by mouth two (2) times a day for 7 days. 19 Yes Sarai Mercado PA-C   ibuprofen (ADVIL) 200 mg tablet Take 200-400 mg by mouth every eight (8) hours as needed for Pain. Yes Provider, Historical   atorvastatin (LIPITOR) 40 mg tablet Take 1 Tab by mouth daily. 18  Yes Amelia Vang MD   simethicone (GAS-X) 125 mg capsule Take 125 mg by mouth four (4) times daily as needed for Flatulence. Yes Jeffery Back MD   tamsulosin (FLOMAX) 0.4 mg capsule Take 0.4 mg by mouth every morning.    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 Objective:     Patient Vitals for the past 8 hrs:   BP Temp Resp SpO2 Height Weight   19 1223  97.8 °F (36.6 °C) 18 99 %     19 1221 (!) 163/91   98 %     19 1201     5' 8\" (1.727 m) 84 kg (185 lb 3 oz)     Temp (24hrs), Av.8 °F (36.6 °C), Min:97.8 °F (36.6 °C), Max:97.8 °F (36.6 °C)      Physical Exam:  General appearance: nl  Lungs: No use of accessory breathing muscles. Breathing unlabored. Cardiac: Regular rate. Abdomen: soft, non-tender, non-distended  Extremities: As per prior exam.     Labs: No results found for this or any previous visit (from the past 24 hour(s)). Assessment:   No medical contraindications to proceeding with planned surgery. Please see initial office note for full discussion of risks, benefits, and alternatives to surgery. Patient Active Problem List    Diagnosis Date Noted    NSTEMI (non-ST elevated myocardial infarction) ([facility] [address] 2018    Lumbar stenosis with neurogenic claudication 2017    Hernia, inguinal, right 2015         Plan:   Proceed with surgery  Pt. stable  Pt.  NPO x meds

## 2019-06-21 NOTE — DISCHARGE INSTRUCTIONS
Upper Extremity Surgery Discharge Instructions  Dr. Noman Mcintyre    Please take the time to review the following instructions before you leave the hospital and use them as guidelines during your recovery from surgery. If you have any questions, you may contact my office at [phone]. Wound Care / Dressing Change    Please remove your dressings on the third day after your surgery. You may replace your dressing with band-aids. A big, bulky dressing isnt necessary as long as there is no drainage from the incisions. You can put a band-aid or piece of gauze over each incision. It isnt necessary to apply antibiotic ointment to your incisions. If you have steri-strips over you incision, they will start to peel off in 7-10 days as you get them wet. They dont need to be removed before that. When they begin to peel off, you may remove them. Showering / Bathing    Prior to third day from surgery, you may bathe/shower as long as dressing/splint/cast is kept dry. You may shower with your wound uncovered three days after your surgery. You may get your incisions wet in the shower. Do not vigorously scrub your incisions. Apply a clean, dry dressing after you have dried your incisions. Do not take a bath or get into a swimming pool or [place] until you follow up with Dr. Belia Dias. Do not soak your incisions under water. Activity    Please begin using fingers immediately after surgery, working to improve motion of straightening and flexing your fingers several times per day. No lifting more than 2 lbs with your affected hand until the sutures come out or are checked at your first post-operative visit. Otherwise, you may proceed with activity as tolerated. Ice and Elevation    Keep your hand elevated continuously for 48 hours after surgery. Your hand/wrist should always be above the level of your heart.   Sleep with your arm elevated on several pillows to minimize swelling and discomfort. Continue ice consistently for 48 hours after surgery. After 48 hours, you should ice 3 times per day for 20 minutes at a time for the next 5 days. After 1 week from surgery, you may use ice as needed for pain. Diet    You may advance your regular diet as tolerated. Increase your clear liquid intake for the next 2-3 days. Medications    1. You will be given prescriptions for pain medication, and nausea when you are discharged from the hospital. Please use the medications as prescribed. Pain medications may cause constipation - over the counter Colace or Milk of Magnesia may be used as needed. Other possible side effects of pain medications are dizziness, headache, nausea, vomiting, and urinary retention. Discontinue the pain medication if you develop itching, rash, shortness of breath, or difficulties swallowing. If these symptoms become severe or arent relieved by discontinuing the medication, you should seek immediate medical attention. 2. Refills of pain medication are authorized during office hours only (8AM - 5PM Monday through Friday)  3. If you pain medication prescribed at the time of surgery contains Tylenol/Acetaminophen, DO NOT TAKE additional Tylenol/Acetaminophen. Do not exceed 4000mg of Tylenol/Acetaminophen per day. 4. You may resume the medication you were taking prior to your surgery. Pain medication may change the effects of any antidepressant medication you may be taking. If you have any questions about possible interactions between your regular medication and the pain medication, you should consult the physician who prescribes your regular medications. 5. Do not drive while taking pain medication. 6. You were prescribed a nausea medication. It is only necessary to fill this if you are experiencing nausea.       Please call the office at [phone] if you have any increasing numbness or tingling, increasing drainage on your dressing, fever greater than 100.5 degrees F, or pain not controlled by medications. If you are experiencing chest pain or shortness of breath, please alert your primary care physician immediately. DISCHARGE SUMMARY from your Nurse      PATIENT INSTRUCTIONS    After general anesthesia or intravenous sedation, for 24 hours or while taking prescription Narcotics:  · Limit your activities  · Do not drive and operate hazardous machinery  · Do not make important personal or business decisions  · Do  not drink alcoholic beverages  · If you have not urinated within 8 hours after discharge, please contact your surgeon on call. Report the following to your surgeon:  · Excessive pain, swelling, redness or odor of or around the surgical area  · Temperature over 100.5  · Nausea and vomiting lasting longer than 4 hours or if unable to take medications  · Any signs of decreased circulation or nerve impairment to extremity: change in color, persistent  numbness, tingling, coldness or increase pain  · Any questions      COUGH AND DEEP BREATHE    Breathing deeply and coughing are very important exercises to do after surgery. Deep breathing and coughing open the little air tubes and air sacks in your lungs. You take deep breaths every day. You may not even notice - it is just something you do when you sigh or yawn. It is a natural exercise you do to keep these air passages open. After surgery, take deep breaths and cough, on purpose. DIRECTIONS:  · Take 10 to 15 slow deep breaths every hour while awake. · Breathe in deeply, and hold it for 2 seconds. · Exhale slowly through puckered lips, like blowing up a balloon. · After every 4th or 5th deep breath, hug your pillow to your chest or belly and give a hard, deep cough. Yes, it will probably hurt. But doing this exercise is a very important part of healing after surgery. Take your pain medicine to help you do this exercise without too much pain.     Coughing and deep breathing help prevent bronchitis and pneumonia after surgery. If you had chest or belly surgery, use a pillow as a \"hug darrick\" and hold it tightly to your chest or belly when you cough. ANKLE PUMPS    Ankle pumps increase the circulation of oxygenated blood to your lower extremities and decrease your risk for circulation problems such as blood clots. They also stretch the muscles, tendons and ligaments in your foot and ankle, and prevent joint contracture in the ankle and foot, especially after surgeries on the legs. It is important to do ankle pump exercises regularly after surgery because immobility increases your risk for developing a blood clot. Your doctor may also have you take an Aspirin for the next few days as well. If your doctor did not ask you to take an Aspirin, consult with him before starting Aspirin therapy on your own. The exercise is quite simple. · Slowly point your foot forward, feeling the muscles on the top of your lower leg stretch, and hold this position for 5 seconds. · Next, pull your foot back toward you as far as possible, stretching the calf muscles, and hold that position for 5 seconds. · Repeat with the other foot. · Perform 10 repetitions every hour while awake for both ankles if possible (down and then up with the foot once is one repetition). You should feel gentle stretching of the muscles in your lower leg when doing this exercise. If you feel pain, or your range of motion is limited, don't push too hard. Only go the limit your joint and muscles will let you go. If you have increasing pain, progressively worsening leg warmth or swelling, STOP the exercise and call your doctor. MEDICATION AND   SIDE EFFECT GUIDE    The [employer] MEDICATION AND SIDE EFFECT GUIDE was provided to the PATIENT AND CARE PROVIDER.   Information provided includes instruction about drug purpose and common side effects for the following medications: · [place] Cardoso  · STEFFANIE        These are general instructions for a healthy lifestyle:    *   Please give a list of your current medications to your Primary Care Provider. *   Please update this list whenever your medications are discontinued, doses are changed, or new medications (including over-the-counter products) are added. *   Please carry medication information at all times in case of emergency situations. About Smoking  No smoking / No tobacco products  Avoid exposure to second hand smoke     Surgeon General's Warning:  Quitting smoking now greatly reduces serious risk to your health. Obesity, smoking, and sedentary lifestyle greatly increases your risk for illness and disease. A healthy diet, regular physical exercise & weight monitoring are important for maintaining a healthy lifestyle. Congestive Heart Failure  You may be retaining fluid if you have a history of heart failure or if you experience any of the following symptoms:  Weight gain of 3 pounds or more overnight or 5 pounds in a week, increased swelling in your hands or feet or shortness of breath while lying flat in bed. Please call your doctor as soon as you notice any of these symptoms; do not wait until your next office visit. Recognize signs and symptoms of STROKE:  F -  Face looks uneven  A -  Arms unable to move or move evenly  S -  Speech slurred or non-existent  T -  Time-call 911 as soon as signs and symptoms begin-DO NOT go          back to bed or wait to see if you get better-TIME IS BRAIN. Warning Signs of HEART ATTACK   Call 911 if you have these symptoms:     Chest discomfort. Most heart attacks involve discomfort in the center of the chest that lasts more than a few minutes, or that goes away and comes back. It can feel like uncomfortable pressure, squeezing, fullness, or pain.  Discomfort in other areas of the upper body.  Symptoms can include pain or discomfort in one or both arms, the back, neck, jaw, or stomach.  Shortness of breath with or without chest discomfort.  Other signs may include breaking out in a cold sweat, nausea, or lightheadedness. Don't wait more than five minutes to call 911 - MINUTES MATTER! Fast action can save your life. Calling 911 is almost always the fastest way to get lifesaving treatment. Emergency Medical Services staff can begin treatment when they arrive -- up to an hour sooner than if someone gets to the hospital by car. The discharge information has been reviewed with the patient and caregiver. Any questions and concerns from the patient and caregiver have been addressed. The patient and caregiver verbalized understanding. Other information in your discharge envelope:  [x]     PRESCRIPTIONS  []     PHYSICAL THERAPY PRESCRIPTION  []     APPOINTMENT CARDS  []     Regional Anesthesia Pamphlet for block or block with On-Q Catheter from   Anesthesia Service  []     Medical device information sheets/pamphlets from their    []     School/work excuse note. []     /parent work excuse note. The following personal items collected during your admission are returned to you:   Dental Appliance: Dental Appliances: Uppers, Lowers  Vision:    Hearing Aid:    Jewelry:    Clothing:    Other Valuables:    Valuables sent to safe:            Going Home After A  Peripheral Nerve Block    Patient Information    The anesthesiology team has provided for your pain control through a technique called regional anesthesia. As the name implies, anesthesia (decreased or no pain, sensation, or motor control) has been provided to a specific region, whether that be an arm or a leg. How does this happen?  you might ask.     While you were sleepy, the anesthesia provider provided medicine to a specific group of nerves either in the neck/shoulder region or in the groin and/or buttock region, similar to the way a dentist might numb a tooth (teeth.)  They typically use an ultrasound machine to know where the medicine is placed in relation to the nerves they wish to numb up or block.   What this means is that for the next few hours, you should expect to have a numb limb. Below are some things we wish for you to read and be familiar with concerning your anesthetized limb. Caring For a Blocked Body Part    General Considerations:   The numbness may last up to 24 hours   You must protect your arm or leg. The blocked extremity is numb, weak, and difficult for your brain to locate and communicate with. To do this you should:  o Pay attention to the position of the blocked limb at all times. o Be very careful when placing hot or cod items on a numb limb. You could cause tissue damage like burns or frostbite if you are unable to feel temperature. Carefully follow your discharge instructions regarding the use of these therapies in you post-operative care. o Carefully pad the affected limb. Normally we continually move and adjust the position of our bodies without thinking about it. This movement and continuous repositioning helps to prevent injuries from immobility. When a limb is numb it still requires this care  o Be extremely careful not to bump or hit the numb body part. This can result in an unrecognized injury, at lease until the blocked limb wakes up. It can also result in worse pain of your already post-surgical limb. Arm/Shoulder Blocks:   You may experience a droopy eyelid, nasal stuffiness, and redness of the eye after receiving an arm/shoulder block. This is called Leonels Syndrome, and is very common. There is no need for concern. You may also experience mild hoarseness, but all of these symptoms should resolve within 24 hours.  Some patients may experience mild shortness of breath. A sitting position will help alleviate this and it should resolve within 24 hours.   If you experience significant or progressive worsening of the shortness of breath, seek medical attention immediately. Knee/Foot Blocks:   DO NOT use the blocked leg to walk, balance or support yourself. Your leg will not be able to balance your weight properly while any part of your leg is numb. You are at a RISK for [address].  Be careful not to drag your foot along the ground or stub your toes. Contact Phone Numbers    CALL 911 IN CASE OF AN EMERGENCY. For all other non-emergency inquiries call the [facility]  at [phone] and ask for the anesthesiologist on call to be paged.

## 2019-06-21 NOTE — ANESTHESIA PREPROCEDURE EVALUATION
Relevant Problems   No relevant active problems       Anesthetic History   No history of anesthetic complications            Review of Systems / Medical History  Patient summary reviewed, nursing notes reviewed and pertinent labs reviewed    Pulmonary  Within defined limits                 Neuro/Psych   Within defined limits           Cardiovascular    Hypertension: well controlled          CAD    Exercise tolerance: >4 METS  Comments: Has held Eliquis since 6/17/19   GI/Hepatic/Renal           PUD     Endo/Other             Other Findings            Physical Exam    Airway  Mallampati: III    Neck ROM: normal range of motion   Mouth opening: Normal     Cardiovascular    Rhythm: regular  Rate: normal         Dental    Dentition: Full lower dentures and Full upper dentures     Pulmonary  Breath sounds clear to auscultation               Abdominal  GI exam deferred       Other Findings            Anesthetic Plan    ASA: 3  Anesthesia type: MAC and regional - supraclavicular block          Induction: Intravenous

## 2019-06-22 NOTE — OP NOTES
[facility] 80  OPERATIVE REPORT    Name:  Katiuska Wild  MR#:  [id number]  :  1935  ACCOUNT #:  [de-identified]  DATE OF SERVICE:  2019    PREOPERATIVE DIAGNOSES:  1. Left hand laceration. 2.  Retained foreign body, left hand. POSTOPERATIVE DIAGNOSES:  1. Left hand laceration. 2.  Retained foreign body, left hand. PROCEDURES PERFORMED:  1. Wound exploration, left hand. 2.  Removal of foreign body, left hand, deep. SURGEON:  Evy Bustamante MD    ASSISTANT:  Salvador Styles    ANESTHESIA:  Regional.    COMPLICATIONS:  None. SPECIMENS REMOVED:  None. IMPLANTS:  None. ESTIMATED BLOOD LOSS:  Minimal.    FINDINGS:  A 4 mm x 2 mm metallic foreign object buried in the first dorsal interosseous muscle. INDICATION FOR PROCEDURE:  The patient is a pleasant 56-year-old gentleman who accidentally cut his left hand with a paring knife, piercing his hand in a through-and-through fashion. X-rays in the emergency department demonstrated radiopaque foreign body in the path of the laceration and this was supposed to be a piece of the zip tie that he was cutting. We discussed treatment options and he elected to proceed with exploration, irrigation and removal of the foreign body, understanding the risks of persistent pain, wound healing problems, damage to surrounding nerves and blood vessels, and need for further surgery. He signed informed consent. DESCRIPTION OF PROCEDURE:  The patient was seen and identified in the Preanesthesia care unit. Operative site was marked. He was brought to the operative suite on a stretcher. After a brachial plexus block was placed, he was transferred to the operating room table. He was prepped and draped in the usual fashion. A timeout was undertaken confirming the appropriate site and side of the procedure. Next, sutures were removed and a large hematoma in the palm was evacuated. Wound was copiously irrigated.   Then, with the assistance of fluoroscopy, the foreign body was isolated and removed using a Tonsil. There was notable destruction of the first dorsal interosseous, though this was not completely transected. The piece that was encountered was noted to be a metallic-appearing shard as opposed to any plastic piece of a zip tie. At this point, the wound was copiously irrigated with bacitracin-containing saline. A tourniquet had been inflated prior to opening the wound and this was deflated. Hemostasis was obtained with direct pressure. The skin was closed with nylon. Sterile dressing was applied. The patient was allowed to emerge from anesthesia and did so uneventfully. POSTOPERATIVE PLAN:  Return to clinic in two weeks for wound check.       Kg Elliott MD      [id number]  D:  06/21/2019 15:15  T:  06/22/2019 0:05  JOB #:  [id number]  CC:  Andreia Watson MD

## 2020-05-08 ENCOUNTER — APPOINTMENT (OUTPATIENT)
Dept: CT IMAGING | Age: 85
End: 2020-05-08
Attending: NURSE PRACTITIONER
Payer: MEDICARE

## 2020-05-08 ENCOUNTER — HOSPITAL ENCOUNTER (EMERGENCY)
Age: 85
Discharge: HOME | End: 2020-05-08
Attending: STUDENT IN AN ORGANIZED HEALTH CARE EDUCATION/TRAINING PROGRAM
Payer: MEDICARE

## 2020-05-08 ENCOUNTER — APPOINTMENT (OUTPATIENT)
Dept: GENERAL RADIOLOGY | Age: 85
End: 2020-05-08
Attending: STUDENT IN AN ORGANIZED HEALTH CARE EDUCATION/TRAINING PROGRAM
Payer: MEDICARE

## 2020-05-08 VITALS
SYSTOLIC BLOOD PRESSURE: 175 MMHG | TEMPERATURE: 97.5 F | BODY MASS INDEX: 29.19 KG/M2 | HEART RATE: 80 BPM | DIASTOLIC BLOOD PRESSURE: 88 MMHG | HEIGHT: 67 IN | WEIGHT: 186 LBS | OXYGEN SATURATION: 95 % | RESPIRATION RATE: 16 BRPM
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
## 2020-05-08 DIAGNOSIS — S40.022A HEMATOMA OF ARM, LEFT, INITIAL ENCOUNTER: ICD-10-CM

## 2020-05-08 PROCEDURE — 99283 EMERGENCY DEPT VISIT LOW MDM: CPT

## 2020-05-08 PROCEDURE — 74011250636 HC RX REV CODE- 250/636: Performed by: NURSE PRACTITIONER

## 2020-05-08 PROCEDURE — 70450 CT HEAD/BRAIN W/O DYE: CPT

## 2020-05-08 PROCEDURE — 90715 TDAP VACCINE 7 YRS/> IM: CPT | Performed by: NURSE PRACTITIONER

## 2020-05-08 PROCEDURE — 72125 CT NECK SPINE W/O DYE: CPT

## 2020-05-08 PROCEDURE — 73090 X-RAY EXAM OF FOREARM: CPT

## 2020-05-08 PROCEDURE — 99282 EMERGENCY DEPT VISIT SF MDM: CPT

## 2020-05-08 PROCEDURE — 90471 IMMUNIZATION ADMIN: CPT

## 2020-05-08 RX ADMIN — TETANUS TOXOID, REDUCED DIPHTHERIA TOXOID AND ACELLULAR PERTUSSIS VACCINE, ADSORBED 0.5 ML: 5; 2.5; 8; 8; 2.5 SUSPENSION INTRAMUSCULAR at 18:24

## 2020-05-08 NOTE — DISCHARGE INSTRUCTIONS
Patient Education        Preventing Falls: Care Instructions  Your Care Instructions    Getting around your home safely can be a challenge if you have injuries or health problems that make it easy for you to fall. Loose rugs and furniture in walkways are among the dangers for many older people who have problems walking or who have poor eyesight. People who have conditions such as arthritis, osteoporosis, or dementia also have to be careful not to fall. You can make your home safer with a few simple measures. Follow-up care is a key part of your treatment and safety. Be sure to make and go to all appointments, and call your doctor if you are having problems. It's also a good idea to know your test results and keep a list of the medicines you take. How can you care for yourself at home? Taking care of yourself  · You may get dizzy if you do not drink enough water. To prevent dehydration, drink plenty of fluids, enough so that your urine is light yellow or clear like water. Choose water and other caffeine-free clear liquids. If you have kidney, heart, or liver disease and have to limit fluids, talk with your doctor before you increase the amount of fluids you drink. · Exercise regularly to improve your strength, muscle tone, and balance. Walk if you can. Swimming may be a good choice if you cannot walk easily. · Have your vision and hearing checked each year or any time you notice a change. If you have trouble seeing and hearing, you might not be able to avoid objects and could lose your balance. · Know the side effects of the medicines you take. Ask your doctor or pharmacist whether the medicines you take can affect your balance. Sleeping pills or sedatives can affect your balance. · Limit the amount of alcohol you drink. Alcohol can impair your balance and other senses. · Ask your doctor whether calluses or corns on your feet need to be removed.  If you wear loose-fitting shoes because of calluses or corns, you can lose your balance and fall. · Talk to your doctor if you have numbness in your feet. Preventing falls at home  · Remove raised doorway thresholds, throw rugs, and clutter. Repair loose carpet or raised areas in the floor. · Move furniture and electrical cords to keep them out of walking paths. · Use nonskid floor wax, and wipe up spills right away, especially on ceramic tile floors. · If you use a walker or cane, put rubber tips on it. If you use crutches, clean the bottoms of them regularly with an abrasive pad, such as steel wool. · Keep your house well lit, especially [place], and outside walkways. Use night-lights in areas such as hallways and bathrooms. Add extra light switches or use remote switches (such as switches that go on or off when you clap your hands) to make it easier to turn lights on if you have to get up during the night. · Install sturdy handrails on stairways. · Move items in your cabinets so that the things you use a lot are on the lower shelves (about waist level). · Keep a cordless phone and a flashlight with new batteries by your bed. If possible, put a phone in each of the main rooms of your house, or carry a cell phone in case you fall and cannot reach a phone. Or, you can wear a device around your neck or wrist. You push a button that sends a signal for help. · Wear low-heeled shoes that fit well and give your feet good support. Use footwear with nonskid soles. Check the heels and soles of your shoes for wear. Repair or replace worn heels or soles. · Do not wear socks without shoes on wood floors. · Walk on the grass when the sidewalks are slippery. If you live in an area that gets snow and ice in the winter, sprinkle salt on slippery steps and sidewalks. Preventing falls in the bath  · Install grab bars and nonskid mats inside and outside your shower or tub and near the toilet and sinks. · Use shower chairs and bath benches.   · Use a hand-held shower head that will allow you to sit while showering. · Get into a tub or shower by putting the weaker leg in first. Get out of a tub or shower with your strong side first.  · Repair loose toilet seats and consider installing a raised toilet seat to make getting on and off the toilet easier. · Keep your bathroom door unlocked while you are in the shower. Where can you learn more? Go to http://letty-raisa.info/. Enter [phone] in the search box to learn more about \"Preventing Falls: Care Instructions. \"  Current as of: March 16, 2018  Content Version: 11.8  © 3216-5888 [employer], [employer]. Care instructions adapted under license by [employer] (which disclaims liability or warranty for this information). If you have questions about a medical condition or this instruction, always ask your healthcare professional. [address] any warranty or liability for your use of this information.

## 2020-05-08 NOTE — ED PROVIDER NOTES
This is an 70-year-old male who presents ambulatory to the emergency room status post fall from 3 steps up on a ladder. Patient states this happened about 40 minutes prior to arrival.  Patient states he missed stepped causing him to fall on a planter, making it break into pieces. Patient states he hit the planter with his left arm, now with a hematoma. Patient denies hitting his head, denies loss of consciousness. Patient is on Eliquis currently. Denies any syncopal event prior to the fall. Denies chest pain, shortness of breath, dizziness, nausea or vomiting, fever or chills. There are no further complaints at this time. Raphael Govea MD  Past Medical History:  No date: Hypertension  No date: PUD (peptic ulcer disease)      Comment:  Pt states he had a 'peptic ulcer' in his early 25s  Past Surgical History:  2015: HX CATARACT REMOVAL; Bilateral  02/2015: HX HERNIA REPAIR; Right      Comment:  Right inguinal hernia repair  11/2016: HX KNEE REPLACEMENT; Right  07/2016: HX KNEE REPLACEMENT;  Left             Past Medical History:   Diagnosis Date    Hypertension     PUD (peptic ulcer disease)     Pt states he had a 'peptic ulcer' in his early 25s       Past Surgical History:   Procedure Laterality Date    HX CATARACT REMOVAL Bilateral 2015    HX HERNIA REPAIR Right 02/2015    Right inguinal hernia repair    HX KNEE REPLACEMENT Right 11/2016    HX KNEE REPLACEMENT Left 07/2016         Family History:   Problem Relation Age of Onset    Cancer Mother         breast    No Known Problems Father     No Known Problems Brother     No Known Problems Brother        Social History     Socioeconomic History    Marital status:      Spouse name: Not on file    Number of children: Not on file    Years of education: Not on file    Highest education level: Not on file   Occupational History    Not on file   Social Needs    Financial resource strain: Not on file    Food insecurity     Worry: Not on file     Inability: Not on file    Transportation needs     Medical: Not on file     Non-medical: Not on file   Tobacco Use    Smoking status: Never Smoker    Smokeless tobacco: Never Used   Substance and Sexual Activity    Alcohol use: No     Comment: 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    Drug use: No    Sexual activity: Not on file   Lifestyle    Physical activity     Days per week: Not on file     Minutes per session: Not on file    Stress: Not on file   Relationships    Social connections     Talks on phone: Not on file     Gets together: Not on file     Attends Jew service: Not on file     Active member of club or organization: Not on file     Attends meetings of clubs or organizations: Not on file     Relationship status: Not on file    Intimate partner violence     Fear of current or ex partner: Not on file     Emotionally abused: Not on file     Physically abused: Not on file     Forced sexual activity: Not on file   Other Topics Concern    Not on file   Social History Narrative    Not on file         ALLERGIES: Patient has no known allergies. Review of Systems   Constitutional: Negative for activity change, appetite change, chills, fatigue and fever. HENT: Negative for congestion, ear discharge, ear pain, sinus pressure, sinus pain, sore throat and trouble swallowing. Eyes: Negative for photophobia, pain, redness, itching and visual disturbance. Respiratory: Negative for chest tightness and shortness of breath. Cardiovascular: Negative for chest pain and palpitations. Gastrointestinal: Negative for abdominal distention, abdominal pain, nausea and vomiting. Endocrine: Negative. Genitourinary: Negative for difficulty urinating, frequency and urgency. Musculoskeletal: Positive for arthralgias (left arm pain with hematoma, ace wrap on now). Negative for back pain, neck pain and neck stiffness. Skin: Negative for color change, pallor, rash and wound. Allergic/Immunologic: Negative. Neurological: Negative for dizziness, syncope, weakness and headaches. Hematological: Does not bruise/bleed easily. Psychiatric/Behavioral: Negative for behavioral problems. The patient is not nervous/anxious. Vitals:    05/08/20 1620   BP: (!) 201/93   Pulse: 80   Resp: 16   Temp: 97.5 °F (36.4 °C)   SpO2: 96%   Weight: 84.4 kg (186 lb)   Height: 5' 7\" (1.702 m)            Physical Exam  Vitals signs and nursing note reviewed. Exam conducted with a chaperone present. Constitutional:       General: He is not in acute distress. Appearance: Normal appearance. He is well-developed. HENT:      Head: Normocephalic and atraumatic. Right Ear: External ear normal.      Left Ear: External ear normal.      Nose: Nose normal.      Mouth/Throat:      Mouth: Mucous membranes are moist.   Eyes:      General:         Right eye: No discharge. Left eye: No discharge. Conjunctiva/sclera: Conjunctivae normal.      Pupils: Pupils are equal, round, and reactive to light. Neck:      Musculoskeletal: Normal range of motion and neck supple. Vascular: No JVD. Trachea: No tracheal deviation. Cardiovascular:      Rate and Rhythm: Normal rate and regular rhythm. Pulses: Normal pulses. Heart sounds: Normal heart sounds. No murmur. No gallop. Pulmonary:      Effort: Pulmonary effort is normal. No respiratory distress. Breath sounds: Normal breath sounds. No wheezing or rales. Chest:      Chest wall: No tenderness. Abdominal:      General: Bowel sounds are normal. There is no distension. Palpations: Abdomen is soft. Tenderness: There is no abdominal tenderness. There is no guarding or rebound. Genitourinary:     Comments: Negative    Musculoskeletal: Normal range of motion. General: Tenderness present. Skin:     General: Skin is warm and dry. Capillary Refill: Capillary refill takes less than 2 seconds. Coloration: Skin is not pale. Findings: Bruising present. No erythema or rash. Neurological:      General: No focal deficit present. Mental Status: He is alert and oriented to person, place, and time. Motor: No weakness. Coordination: Coordination normal.   Psychiatric:         Mood and Affect: Mood normal.         Behavior: Behavior normal.         Thought Content: Thought content normal.         Judgment: Judgment normal.          MDM  Number of Diagnoses or Management Options  Fall, initial encounter: new and requires workup  Hematoma of arm, left, initial encounter: new and requires workup  Diagnosis management comments: Radiology negative. Patient discharged home and follow-up with PCP. Return to the emergency room with worsening symptoms. Patient in agreement with plan of care. Amount and/or Complexity of Data Reviewed  Tests in the radiology section of CPT®: ordered and reviewed    1819:   Pt has been reexamined. Pt has no new complaints, changes or physical findings. Care plan outlined and precautions discussed. All available results were reviewed with pt. All medications were reviewed with pt. All of pt's questions and concerns were addressed. Pt agrees to F/U as instructed and agrees to return to ED upon further deterioration. Pt is ready to go home.   Hayley Ortiz NP      Procedures

## 2020-05-08 NOTE — ED TRIAGE NOTES
Pt reports falling off the 3rd rung of a step ladder about 40 mins ago. Pt states he missed a step falling backwards and the ladder fell over on him. Hematoma noted to pt's left forearm. Pt denies hitting head. Pt's tetanus is up to date.

## 2022-01-07 ENCOUNTER — HOSPITAL ENCOUNTER (OUTPATIENT)
Dept: PHYSICAL THERAPY | Age: 87
Discharge: HOME | End: 2022-01-07
Payer: MEDICARE

## 2022-01-07 PROCEDURE — 97112 NEUROMUSCULAR REEDUCATION: CPT

## 2022-01-07 PROCEDURE — 97116 GAIT TRAINING THERAPY: CPT

## 2022-01-07 PROCEDURE — 97162 PT EVAL MOD COMPLEX 30 MIN: CPT

## 2022-01-07 NOTE — PROGRESS NOTES
PT INITIAL EVALUATION NOTE - [facility] 2-15    Patient Name: Ashlie Good  Date:2022  : 1935  [x]  Patient  Verified  Payor: Win Nones / Plan: [place] / Product Type: Medicare /    In time: 9:30 am  Out time: 10:30   Total Treatment Time (min): 60  Total Timed Codes (min): 20  1:1 Treatment Time ([address] only): 20   Visit #: 1     Treatment Area: Other abnormalities of gait and mobility [R26.89]    SUBJECTIVE  Pain Level (0-10 scale): Current- 0    Any medication changes, allergies to medications, adverse drug reactions, diagnosis change, or new procedure performed?: [] No    [x] Yes (see summary sheet for update)  Subjective:    Chief complaint: Balance difficulty, gradual increased over the last few years. He reports that his urologist recently told him to d/c a medication with fall risk as a side effect, he stopped taking this yesterday and plans to assess response. He reports that he feels unstable but not lightheaded or dizzy. He has not fallen recently, last fall was 2 years ago off of a ladder. He hasn't used a cane- \"I think it's a mental thing, old people use canes. \"  He is open to attempting a SPC. Aggravating factors: standing still for longer than 10 minutes, starting and stopping walking, stairs, furniture walks in the dark   Easing factors: rest, better on the move  Numbness/tingling: intermittent numbness into L hand and arm   PLOF: Used to be able to bike the [employer] 4x/year but sold the bikes and gave it up 2.5 years ago out of fear of falling  Mechanism of Injury: Insidious onset   Previous Treatment/Compliance: Previous PT a couple of years ago- \"I didn't take it as seriously as I'm going to take it now. \"   PMHx/Surgical Hx: HTN, PUD, s/p B cataract removal, B knee replacement, hernia repair   Work Hx: Self-employed owns a business, all computer work   Living Situation: multiple small sets of steps to get into house, basement, attic, etc, all with handrails  Pt Goals: \"I'd like to be able to walk with her, she can go a couple of miles. \"   Barriers: Potential for medication contribution to fall risk, chronicity   Motivation: Motivated, open to utilizing a SPC  Substance use: None noted    Cognition: A & O x 4       OBJECTIVE/EXAMINATION    Gait:  Bradykinetic, hypokinetic, B toe out, stumbling at start and stop                          MMT:     R  L  Hip flexion    4+  4+  Knee extension 5  5  Knee flexion   4+   4+  Dorsiflexion  3+  3+  Plantarflexion  4+  3+  Inversion  4+  3+  Eversion  4   3+  Hip extension  3+  3+  Hip abduction   4  4    ROM: B knees limited in flexion        Sensation:  WNL    Vestibular assessment:  Grossly WNL     Optional Tests:           Sinclair Balance Scale (56pt scale): 38/56       10 min Neuromuscular Re-education:  [x]  See flow sheet :   Rationale: improve coordination, improve balance and increase proprioception  to improve the patients ability to ambulate and perform ADL's     10 min Gait Training:  Instructed in progression of SPC through gait cycle, heel to toe foot progression   Rationale: improve coordination and improve balance  to improve the patients ability to ambulate and perform ADL's           With   [] TE   [] TA   [] Neuro   [] SC   [] other: Patient Education: [x] Review HEP    [] Progressed/Changed HEP based on:   [] positioning   [] body mechanics   [] transfers   [] heat/ice application    [] other:      Other Objective/Functional Measures: FOTO Functional Measure: 44/100                     Pain Level (0-10 scale) post treatment: 0    ASSESSMENT/Changes in Function:     [x]  See Plan of Aide Marcelino 27, DPT 1/7/2022

## 2022-01-10 NOTE — PROGRESS NOTES
Physical Therapy at [facility],   a part of [facility]  [facility] Yudelka Mendenhall  Phone: [phone]  Fax: [phone]    Plan of Care/Statement of Necessity for Physical Therapy Services  2-15    Patient name: Marisol Regan  : 1935  Provider#: [id number]  Referral source: Samson Tierney MD      Medical/Treatment Diagnosis: Other abnormalities of gait and mobility [R26.89]     Prior Hospitalization: see medical history     Comorbidities: HTN, PUD  Prior Level of Function: Used to be able to bike the [employer] 4x/year but sold the bikes and gave it up 2.5 years ago out of fear of falling  Medications: Verified on Patient Summary List  Start of Care: 22      Onset Date: Dinorah Minor increased over the last few years\"   The Plan of Care and following information is based on the information from the initial evaluation. Assessment/ key information: Patient is an 81year old male presenting with high fall risk and fear of falling. Current symptoms limit functional ability to walk for exercise, maneuver stairs or  line at the grocery store. Marked deficits include L>R lower extremity weakness, gait instability improved with SPC, and static balance deficits exhibited by 38/56 score on BARONE balance assessment. He does not demonstrate vestibular involvement contributing to balance deficits. Patient will benefit from skilled PT to address all previously listed deficits. Evaluation Complexity History MEDIUM  Complexity : 1-2 comorbidities / personal factors will impact the outcome/ POC ; Examination MEDIUM Complexity : 3 Standardized tests and measures addressing body structure, function, activity limitation and / or participation in recreation  ;Presentation MEDIUM Complexity : Evolving with changing characteristics  ; Clinical Decision Making MEDIUM Complexity : FOTO score of 26-74  Overall Complexity Rating: MEDIUM    Problem List: decrease ROM, decrease strength, impaired gait/ balance, decrease ADL/ functional abilitiies, decrease activity tolerance and decrease transfer abilities   Treatment Plan may include any combination of the following: Therapeutic exercise, Therapeutic activities, Neuromuscular re-education, Physical agent/modality, Gait/balance training, Manual therapy, Patient education, Self Care training, Functional mobility training, Home safety training and Stair training  Patient / Family readiness to learn indicated by: asking questions, trying to perform skills and interest  Persons(s) to be included in education: patient (P)  Barriers to Learning/Limitations: None  Patient Goal (s): I'd like to be able to walk with her, she can go a couple of miles  Patient Self Reported Health Status: good  Rehabilitation Potential: good    Short Term Goals: To be accomplished in 4 weeks:  1. Patient will be independent with initial HEP in order to transition to general wellness program.  2. Patient will demonstrate L ankle strength of 4/5 or better to progress ankle strengthening. 3. Patient will be able to perform item 10 on the BARONE balance assessment with a score of 2 or better to increase safety standing in lines. Long Term Goals: To be accomplished in 12 weeks:  1. Patient will acquire a [facility] for functional ambulation to decrease fall risk. 2. Patient will be able to ambulate 2 miles with confidence with his wife to achieve self selected goal.  3. Patient will demonstrate L ankle strength grossly 4+/5 or better to stabilize on uneven terrain. 4. Patient will score 43/56 or better on BARONE balance scale to constitute [address] for decreasing fall risk. Frequency / Duration: Patient to be seen 1-2 times per week for 12 weeks.     Patient/ Caregiver education and instruction: self care, activity modification and exercises    [x]  Plan of care has been reviewed with PTA        Certification Period: 1/7/21-4/7/21  Palouse Poag, DPT 1/10/2022     ________________________________________________________________________    I certify that the above Therapy Services are being furnished while the patient is under my care. I agree with the treatment plan and certify that this therapy is necessary.     Physician's Signature:____________________  Date:____________Time: _________         Dona Estevez MD

## 2022-01-11 ENCOUNTER — HOSPITAL ENCOUNTER (OUTPATIENT)
Dept: PHYSICAL THERAPY | Age: 87
Discharge: HOME | End: 2022-01-11
Payer: MEDICARE

## 2022-01-11 PROCEDURE — 97112 NEUROMUSCULAR REEDUCATION: CPT

## 2022-01-11 PROCEDURE — 97110 THERAPEUTIC EXERCISES: CPT

## 2022-01-11 NOTE — PROGRESS NOTES
PT DAILY TREATMENT NOTE - [facility] 2-15    Patient Name: Amberly Frnacois  Date:2022  : 1935  [x]  Patient  Verified  Payor: Bhaskar Peralta / Plan: [place] MED[place] A & B / Product Type: Medicare /    In time:2:52  Out time:3:40  Total Treatment Time (min): 48  Total Timed Codes (min): 48  1:1 Treatment Time ( [address] only): 48   Visit #:  2    Treatment Area: Other abnormalities of gait and mobility [R26.89]    SUBJECTIVE  Pain Level (0-10 scale): 0  Any medication changes, allergies to medications, adverse drug reactions, diagnosis change, or new procedure performed?: [x] No    [] Yes (see summary sheet for update)  Subjective functional status/changes:   [] No changes reported  Patient reports that he has had food poisoning the past 2 days and he feels better today, just tired. He has decided to purchase a cane and plans to graduate from it after therapy. OBJECTIVE      25 min Therapeutic Exercise:  [x] See flow sheet :   Rationale: increase strength to improve the patients ability to ambulate with increased safety     23 min Neuromuscular Re-education:  [x]  See flow sheet :   Rationale: improve coordination, improve balance and increase proprioception  to improve the patients ability to ambulate with increased safety             With   [] TE   [] TA   [] Neuro   [] SC   [] other: Patient Education: [x] Review HEP    [] Progressed/Changed HEP based on:   [] positioning   [] body mechanics   [] transfers   [] heat/ice application    [] other:      Other Objective/Functional Measures: Mod use of bars required for BOSU lunging  EO 1/4 tandem appropriately challenging   EC normal ROCKY still challenging, instructed on progression in difficulty if needed     Pain Level (0-10 scale) post treatment: 0    ASSESSMENT/Changes in Function:   Able to progress balance and therex without excessive difficulty, no return of food poisoning sx.    Patient will continue to benefit from skilled PT services to modify and progress therapeutic interventions, address strength deficits, analyze and cue movement patterns, analyze and modify body mechanics/ergonomics, assess and modify postural abnormalities, address imbalance/dizziness and instruct in home and community integration to attain remaining goals. [x]  See Plan of Care  []  See progress note/recertification  []  See Discharge Summary         Progress towards goals / Updated goals: Independent with HEP at initial follow up visit.      PLAN  [x]  Upgrade activities as tolerated     [x]  Continue plan of care  [x]  Update interventions per flow sheet       []  Discharge due to:_  []  Other:_      Ignacio Luke DPT 1/11/2022

## 2022-01-18 ENCOUNTER — HOSPITAL ENCOUNTER (OUTPATIENT)
Dept: PHYSICAL THERAPY | Age: 87
Discharge: HOME | End: 2022-01-18
Payer: MEDICARE

## 2022-01-18 PROCEDURE — 97112 NEUROMUSCULAR REEDUCATION: CPT

## 2022-01-18 PROCEDURE — 97016 VASOPNEUMATIC DEVICE THERAPY: CPT

## 2022-01-18 PROCEDURE — 97110 THERAPEUTIC EXERCISES: CPT

## 2022-01-18 NOTE — PROGRESS NOTES
PT DAILY TREATMENT NOTE - [facility] 2-15    Patient Name: Mayelin Burks  Date:2022  : 1935  [x]  Patient  Verified  Payor: Brian Hood / Plan: [place] MED[place]ARE PART A & B / Product Type: Medicare /    In time:4:35 p  Out time: 5:35 p  Total Treatment Time (min): 60  Total Timed Codes (min): 45  1:1 Treatment Time ( [address] only): 45   Visit #:  3    Treatment Area: Other abnormalities of gait and mobility [R26.89]    SUBJECTIVE  Pain Level (0-10 scale): 5  Any medication changes, allergies to medications, adverse drug reactions, diagnosis change, or new procedure performed?: [x] No    [] Yes (see summary sheet for update)  Subjective functional status/changes:   [] No changes reported  Patient arrived with a new SPC. He reports that he likes it but plans to get a better one. He reports that he slipped on some black ice a couple of days ago and is sore today.      OBJECTIVE  Modality rationale: decrease inflammation and decrease pain to improve the patients ability to ambulate and perform HEP   Min Type Additional Details    [] Estim: []Att   []Unatt        []TENS instruct                  []IFC  []Premod   []NMES                     []Other:  []w/US   []w/ice   []w/heat  Position:  Location:    []  Traction: [] Cervical       []Lumbar                       [] Prone          []Supine                       []Intermittent   []Continuous Lbs:  [] before manual  [] after manual  []w/heat    []  Ultrasound: []Continuous   [] Pulsed at:                           []1MHz   []3MHz Location:  W/cm2:    [] Paraffin         Location:   []w/heat    []  Ice     []  Heat  []  Ice massage Position:  Location:    []  Laser  []  Other: Position:  Location:   15   [x]  Vasopneumatic Device: L knee Pressure:       [x] lo [] med [] hi   Temperature: 34     [x] Skin assessment post-treatment:  [x]intact []redness- no adverse reaction    []redness - adverse reaction:       25 min Therapeutic Exercise:  [x] See flow sheet :   Rationale: increase strength to improve the patients ability to ambulate with increased safety     20 min Neuromuscular Re-education:  [x]  See flow sheet :   Rationale: improve coordination, improve balance and increase proprioception  to improve the patients ability to ambulate with increased safety             With   [] TE   [] TA   [] Neuro   [] SC   [] other: Patient Education: [x] Review HEP    [] Progressed/Changed HEP based on:   [] positioning   [] body mechanics   [] transfers   [] heat/ice application    [] other:      Other Objective/Functional Measures: Mod use of bars required for BOSU lunging on L, min use on R  EO 1/2 tandem appropriately challenging   EC normal ROCKY still challenging  Pain in L knee bike warm up, no pain noted throughout session     Pain Level (0-10 scale) post treatment: 0    ASSESSMENT/Changes in Function:     Patient will continue to benefit from skilled PT services to modify and progress therapeutic interventions, address strength deficits, analyze and cue movement patterns, analyze and modify body mechanics/ergonomics, assess and modify postural abnormalities, address imbalance/dizziness and instruct in home and community integration to attain remaining goals.      [x]  See Plan of Care  []  See progress note/recertification  []  See Discharge Summary         Progress towards goals / Updated goals:  Able to progress balance exercises, HEP updated     PLAN  [x]  Upgrade activities as tolerated     [x]  Continue plan of care  [x]  Update interventions per flow sheet       []  Discharge due to:_  []  Other:_      Genevieve Cross DPT 1/18/2022

## 2022-01-25 ENCOUNTER — APPOINTMENT (OUTPATIENT)
Dept: PHYSICAL THERAPY | Age: 87
End: 2022-01-25
Payer: MEDICARE

## 2022-02-01 ENCOUNTER — APPOINTMENT (OUTPATIENT)
Dept: PHYSICAL THERAPY | Age: 87
End: 2022-02-01
Payer: MEDICARE

## 2022-02-08 ENCOUNTER — HOSPITAL ENCOUNTER (OUTPATIENT)
Dept: PHYSICAL THERAPY | Age: 87
Discharge: HOME | End: 2022-02-08
Payer: MEDICARE

## 2022-02-08 PROCEDURE — 97112 NEUROMUSCULAR REEDUCATION: CPT

## 2022-02-08 PROCEDURE — 97110 THERAPEUTIC EXERCISES: CPT

## 2022-02-08 NOTE — PROGRESS NOTES
PT DAILY TREATMENT NOTE - [facility] 2-15    Patient Name: Jose Alejandro White  Date:2022  : 1935  [x]  Patient  Verified  Payor: Carlosnakita Meka / Plan: [place] / Product Type: Medicare /    In time:4:00 p  Out time: 4:30 p  Total Treatment Time (min): 30  Total Timed Codes (min): 30  1:1 Treatment Time ( [address] only): 30  Visit #:  4    Treatment Area: Other abnormalities of gait and mobility [R26.89]    SUBJECTIVE  Pain Level (0-10 scale): 5  Any medication changes, allergies to medications, adverse drug reactions, diagnosis change, or new procedure performed?: [x] No    [] Yes (see summary sheet for update)  Subjective functional status/changes:   [] No changes reported  Patient reports that he is feeling okay today as he is recovering from Covid. He reports that he has some knee pain and has been experiencing chest pain in the night which he plans to contact his cardiologist about. OBJECTIVE    10 min Therapeutic Exercise:  [x] See flow sheet :   Rationale: increase strength to improve the patients ability to ambulate with increased safety     20 min Neuromuscular Re-education:  [x]  See flow sheet :   Rationale: improve coordination, improve balance and increase proprioception  to improve the patients ability to ambulate with increased safety             With   [] TE   [] TA   [] Neuro   [] SC   [] other: Patient Education: [x] Review HEP    [] Progressed/Changed HEP based on:   [] positioning   [] body mechanics   [] transfers   [] heat/ice application    [] other:      Other Objective/Functional Measures:   EO 1/2 tandem appropriately challenging   EC normal ROCKY still challenging  No pain noted throughout session, mod fatigue      Pain Level (0-10 scale) post treatment: 0    ASSESSMENT/Changes in Function:   Patient arrived 30 minutes late. Next session plan to work on weight shift side to side and gaze shifting. He does not demonstrate any regression in status following hiatus from therapy. Patient will continue to benefit from skilled PT services to modify and progress therapeutic interventions, address strength deficits, analyze and cue movement patterns, analyze and modify body mechanics/ergonomics, assess and modify postural abnormalities, address imbalance/dizziness and instruct in home and community integration to attain remaining goals.      [x]  See Plan of Care  []  See progress note/recertification  []  See Discharge Summary         Progress towards goals / Updated goals:  HEP updated with dynamic stability, hand out provided      PLAN  [x]  Upgrade activities as tolerated     [x]  Continue plan of care  [x]  Update interventions per flow sheet       []  Discharge due to:_  []  Other:_      Dave Vázquez DPT 2/8/2022

## 2022-02-15 ENCOUNTER — HOSPITAL ENCOUNTER (OUTPATIENT)
Dept: PHYSICAL THERAPY | Age: 87
Discharge: HOME | End: 2022-02-15
Payer: MEDICARE

## 2022-02-15 PROCEDURE — 97110 THERAPEUTIC EXERCISES: CPT

## 2022-02-15 PROCEDURE — 97112 NEUROMUSCULAR REEDUCATION: CPT

## 2022-02-15 NOTE — PROGRESS NOTES
PT DAILY TREATMENT NOTE - [facility] 2-15    Patient Name: Britt Clay  Date:2/15/2022  : 1935  [x]  Patient  Verified  Payor: Pamela Stevens / Plan: [place] MEDICARE PART A & B / Product Type: Medicare /    In time:3:30 p  Out time: 4:20 p  Total Treatment Time (min): 50  Total Timed Codes (min): 50  1:1 Treatment Time ( [address] only): 50  Visit #:  5    Treatment Area: Other abnormalities of gait and mobility [R26.89]    SUBJECTIVE  Pain Level (0-10 scale): 5  Any medication changes, allergies to medications, adverse drug reactions, diagnosis change, or new procedure performed?: [x] No    [] Yes (see summary sheet for update)  Subjective functional status/changes:   [] No changes reported  Patient reports that he has noticed some functional improvements since participating in therapy. He reports that he does not use his cane as much as he is supposed to.      OBJECTIVE    10 min Therapeutic Exercise:  [x] See flow sheet :   Rationale: increase strength to improve the patients ability to ambulate with increased safety     40 min Neuromuscular Re-education:  [x]  See flow sheet :   Rationale: improve coordination, improve balance and increase proprioception  to improve the patients ability to ambulate with increased safety             With   [] TE   [] TA   [] Neuro   [] SC   [] other: Patient Education: [x] Review HEP    [] Progressed/Changed HEP based on:   [] positioning   [] body mechanics   [] transfers   [] heat/ice application    [] other:      Other Objective/Functional Measures:   EO 3/4 tandem 30 seconds   EC 1/2 inch between feet 30 seconds      MMT:                                      R                      L  Dorsiflexion                 4-                    4  Plantarflexion              5                     4  Inversion                     4+                    4  Eversion                      4                      3+     Optional Tests:           Sinclair Balance Scale (56pt scale): 45/56    FOTO: 44/100, unchanged    Pain Level (0-10 scale) post treatment: 0    ASSESSMENT/Changes in Function:   See progress note. Patient will continue to benefit from skilled PT services to modify and progress therapeutic interventions, address strength deficits, analyze and cue movement patterns, analyze and modify body mechanics/ergonomics, assess and modify postural abnormalities, address imbalance/dizziness and instruct in home and community integration to attain remaining goals. []  See Plan of Care  [x]  See progress note/recertification  []  See Discharge Summary         Progress towards goals / Updated goals:    Short Term Goals: To be accomplished in 4 weeks:  1. Patient will be independent with initial HEP in order to transition to general wellness program. MET   2. Patient will demonstrate L ankle strength of 4/5 or better to progress ankle strengthening. Progressing, all except eversion met  3. Patient will be able to perform item 10 on the BARONE balance assessment with a score of 2 or better to increase safety standing in lines. MET     Long Term Goals: To be accomplished in 12 weeks:  1. Patient will acquire a [facility] for functional ambulation to decrease fall risk. MET   2. Patient will be able to ambulate 2 miles with confidence with his wife to achieve self selected goal. Progressing toward   3. Patient will demonstrate L ankle strength grossly 4+/5 or better to stabilize on uneven terrain. Progressing toward   4. Patient will score 43/56 or better on BARONE balance scale to constitute [facility] for decreasing fall risk.  MET       PLAN  [x]  Upgrade activities as tolerated     [x]  Continue plan of care  [x]  Update interventions per flow sheet       []  Discharge due to:_  []  Other:_      Desmond Sabillon DPT 2/15/2022

## 2022-02-15 NOTE — PROGRESS NOTES
Physical Therapy at [facility],   a part of  [place] [facility]  [address] [facility] [place]  Phone: [phone]      Fax:  [phone]    Progress Note    Name: Ashlie Good   : 1935   MD: Godfrey Lyons MD       Treatment Diagnosis: Other abnormalities of gait and mobility [R26.89]  Start of Care: 22    Visits from Start of Care: 5  Missed Visits: 2    Summary of Care:therapeutic exercise, neuromuscular re-education     Assessment / Recommendations: Course of care was moderately interrupted 2/2 Covid. Patient demonstrates steady progress in static and dynamic balance, improving BARONE balance scale from 36/56 to 45/56. He additionally demonstrates improvement in ankle strengthening. He will benefit from continued skilled services for 4 weeks to progress balance exercises and achieve outstanding goals. Short Term Goals: To be accomplished in 4 weeks:  1. Patient will be independent with initial HEP in order to transition to general wellness program. MET  2. Patient will demonstrate L ankle strength of 4/5 or better to progress ankle strengthening. 3. Patient will be able to perform item 10 on the BARONE balance assessment with a score of 2 or better to increase safety standing in lines.      Long Term Goals: To be accomplished in 12 weeks:  1. Patient will acquire a [facility] for functional ambulation to decrease fall risk. MET  2. Patient will be able to ambulate 2 miles with confidence with his wife to achieve self selected goal.  3. Patient will demonstrate L ankle strength grossly 4+/5 or better to stabilize on uneven terrain. 4. Patient will score 43/56 or better on BARONE balance scale to constitute [facility] for decreasing fall risk.       Misbah Fay, TUTU 2/15/2022

## 2022-02-22 ENCOUNTER — HOSPITAL ENCOUNTER (OUTPATIENT)
Dept: PHYSICAL THERAPY | Age: 87
End: 2022-02-22
Payer: MEDICARE

## 2022-03-15 ENCOUNTER — HOSPITAL ENCOUNTER (OUTPATIENT)
Dept: PHYSICAL THERAPY | Age: 87
Discharge: HOME | End: 2022-03-15
Payer: MEDICARE

## 2022-03-15 PROCEDURE — 97112 NEUROMUSCULAR REEDUCATION: CPT

## 2022-03-15 PROCEDURE — 97110 THERAPEUTIC EXERCISES: CPT

## 2022-03-15 NOTE — PROGRESS NOTES
PT DAILY TREATMENT NOTE - [facility] 2-15    Patient Name: Luis Brower  Date:3/15/2022  : 1935  [x]  Patient  Verified  Payor: Levar Walter / Plan: [place] MEDICARE PART A & B / Product Type: Medicare /    In time:3:30 p  Out time: 4:15 p  Total Treatment Time (min): 45  Total Timed Codes (min): 45  1:1 Treatment Time ( [address] only): 45  Visit #:  6    Treatment Area: Other abnormalities of gait and mobility [R26.89]    SUBJECTIVE  Pain Level (0-10 scale): 0  Any medication changes, allergies to medications, adverse drug reactions, diagnosis change, or new procedure performed?: [x] No    [] Yes (see summary sheet for update)  Subjective functional status/changes:   [] No changes reported  Patient reports that he believes he has been able to progress over the past month when he was not able to attend therapy. OBJECTIVE    25 min Therapeutic Exercise:  [x] See flow sheet :   Rationale: increase strength to improve the patients ability to ambulate with increased safety     20 min Neuromuscular Re-education:  [x]  See flow sheet :   Rationale: improve coordination, improve balance and increase proprioception  to improve the patients ability to ambulate with increased safety             With   [] TE   [] TA   [] Neuro   [] SC   [] other: Patient Education: [x] Review HEP    [] Progressed/Changed HEP based on:   [] positioning   [] body mechanics   [] transfers   [] heat/ice application    [] other:      Other Objective/Functional Measures:   EO 3/4 tandem R in front 30 seconds  , L in front 10 sec max   EC 1/2 inch between feet 5 seconds max       Optional Tests:           Sinclair Balance Scale (56pt scale): 46/56      Pain Level (0-10 scale) post treatment: 0    ASSESSMENT/Changes in Function:   See progress note.    Patient will continue to benefit from skilled PT services to modify and progress therapeutic interventions, address strength deficits, analyze and cue movement patterns, analyze and modify body mechanics/ergonomics, assess and modify postural abnormalities, address imbalance/dizziness and instruct in home and community integration to attain remaining goals. []  See Plan of Care  [x]  See progress note/recertification  []  See Discharge Summary         Progress towards goals / Updated goals:    Short Term Goals: To be accomplished in 4 weeks:  1. Patient will be independent with initial HEP in order to transition to general wellness program. MET   2. Patient will demonstrate L ankle strength of 4/5 or better to progress ankle strengthening. Progressing, all except eversion met  3. Patient will be able to perform item 10 on the BARONE balance assessment with a score of 2 or better to increase safety standing in lines. MET     Long Term Goals: To be accomplished in 12 weeks:  1. Patient will acquire a [facility] for functional ambulation to decrease fall risk. MET   2. Patient will be able to ambulate 2 miles with confidence with his wife to achieve self selected goal. Progressing toward   3. Patient will demonstrate L ankle strength grossly 4+/5 or better to stabilize on uneven terrain. Progressing toward   4. Patient will score 43/56 or better on BARONE balance scale to constitute [facility] for decreasing fall risk.  MET       PLAN  [x]  Upgrade activities as tolerated     [x]  Continue plan of care  [x]  Update interventions per flow sheet       []  Discharge due to:_  []  Other:_      Ilene Mehta DPT 3/15/2022

## 2022-03-18 NOTE — PROGRESS NOTES
Physical Therapy at [facility],   a part of  [facility]  [address] [facility], [address] [place].  Phone: [phone]      Fax:  [phone]    Progress Note    Name: Clarice Mir   : 1935   MD: Josh Norwood MD       Treatment Diagnosis: Other abnormalities of gait and mobility [R26.89]  Start of Care: 22    Visits from Start of Care: 6  Missed Visits: 3    Summary of Care:therapeutic exercise, neuromuscular re-education     Assessment / Recommendations: Patient has not been seen since last reassessment 2/2 family emergencies and travel. He reports poor compliance with HEP and demonstrates regression in static balance performance and no change in ankle strength. He will benefit from continued skilled services 1x/week for 4 weeks to achieve outstanding goals. Short Term Goals: To be accomplished in 4 weeks:  1. Patient will be independent with initial HEP in order to transition to general wellness program. MET  2. Patient will demonstrate L ankle strength of 4/5 or better to progress ankle strengthening. Not met   3. Patient will be able to perform item 10 on the BARONE balance assessment with a score of 2 or better to increase safety standing in lines. MET     Long Term Goals: To be accomplished in 12 weeks:  1. Patient will acquire a [facility] for functional ambulation to decrease fall risk. MET  2. Patient will be able to ambulate 2 miles with confidence with his wife to achieve self selected goal.  3. Patient will demonstrate L ankle strength grossly 4+/5 or better to stabilize on uneven terrain. 4. Patient will score 43/56 or better on BARONE balance scale to constitute [facility] for decreasing fall risk.       Heath Banerjee, TUTU 3/18/2022

## 2022-03-19 PROBLEM — M48.062 LUMBAR STENOSIS WITH NEUROGENIC CLAUDICATION: Status: ACTIVE | Noted: 2017-11-28

## 2022-03-19 PROBLEM — I21.4 NSTEMI (NON-ST ELEVATED MYOCARDIAL INFARCTION) (HCC): Status: ACTIVE | Noted: 2018-02-23

## 2022-03-22 ENCOUNTER — HOSPITAL ENCOUNTER (OUTPATIENT)
Dept: PHYSICAL THERAPY | Age: 87
End: 2022-03-22
Payer: MEDICARE

## 2022-04-03 NOTE — PROGRESS NOTES
Chart reviewed for Pre-Procedure evaluation and documentation. -- Areas reviewed, BUT NOT LIMITED TO, are Patient's current and past H&P, Labs, all Notes, all Media records, radiology records, and medications. You can access the FollowMyHealth Patient Portal offered by [facility] by registering at the following website: [email or web address]. By joining [employer]’s FollowMyHealth portal, you will also be able to view your health information using other applications (apps) compatible with our system.

## 2022-04-07 ENCOUNTER — APPOINTMENT (OUTPATIENT)
Dept: PHYSICAL THERAPY | Age: 87
End: 2022-04-07

## 2022-06-08 NOTE — PROGRESS NOTES
Physical Therapy at [facility],   a part of  [facility]  [address] [facility] [place]  Phone: [phone] Fax: [phone]      Discharge Summary 2-15    Patient name: Adele Moses  : 1935  Provider#: [id number]  Referral source: Andreia Hoover MD      Medical/Treatment Diagnosis: Other abnormalities of gait and mobility [R26.89]     Prior Hospitalization: see medical history     Comorbidities: See Plan of Care  Prior Level of Function: See Plan of Care  Medications: Verified on Patient Summary List    Start of Care: 22      Onset Date:\"gradually increased over the last few years\"   Visits from Start of Care: 6     Missed Visits: 2  Reporting Period : 22 to 3/15/22    Assessment/Summary of care: At time of discharge, patient cited illness and travel as limiting factors for attending further therapy. At last reassessment patient demonstrated regression in balance and no change in ankle strength. He would likely benefit from further skilled services to improve function and is welcome to return if his schedule allows. Short Term Goals: To be accomplished in 4 weeks:  1. Patient will be independent with initial HEP in order to transition to general wellness program. MET  2. Patient will demonstrate L ankle strength of 4/5 or better to progress ankle strengthening. Not met   3. Patient will be able to perform item 10 on the BARONE balance assessment with a score of 2 or better to increase safety standing in lines.  MET     Long Term Goals: To be accomplished in 12 weeks:  1. Patient will acquire a [facility] for functional ambulation to decrease fall risk. MET  2. Patient will be able to ambulate 2 miles with confidence with his wife to achieve self selected goal. Not met   3. Patient will demonstrate L ankle strength grossly 4+/5 or better to stabilize on uneven terrain.  Not met   4.  Patient will score 43/56 or better on BARONE balance scale to [facility] for decreasing fall risk.  Not met       RECOMMENDATIONS:  [x]Discontinue therapy: []Patient has reached or is progressing toward set goals     [x]Patient is non-compliant or has abdicated     []Due to lack of appreciable progress towards set goals     []Other  Guerline Melton DPT 6/8/2022

## 2022-10-25 ENCOUNTER — TRANSCRIBE ORDER (OUTPATIENT)
Dept: SCHEDULING | Age: 87
End: 2022-10-25

## 2022-10-25 DIAGNOSIS — M48.061 SPINAL STENOSIS, LUMBAR REGION, WITHOUT NEUROGENIC CLAUDICATION: Primary | ICD-10-CM

## 2022-10-26 ENCOUNTER — HOSPITAL ENCOUNTER (OUTPATIENT)
Dept: MRI IMAGING | Age: 87
Discharge: HOME | End: 2022-10-26
Attending: ORTHOPAEDIC SURGERY
Payer: MEDICARE

## 2022-10-26 DIAGNOSIS — M48.061 SPINAL STENOSIS, LUMBAR REGION, WITHOUT NEUROGENIC CLAUDICATION: ICD-10-CM

## 2022-10-26 PROCEDURE — 72148 MRI LUMBAR SPINE W/O DYE: CPT

## 2022-11-08 ENCOUNTER — APPOINTMENT (OUTPATIENT)
Dept: GENERAL RADIOLOGY | Age: 87
End: 2022-11-08
Attending: EMERGENCY MEDICINE
Payer: MEDICARE

## 2022-11-08 ENCOUNTER — HOSPITAL ENCOUNTER (EMERGENCY)
Age: 87
Discharge: HOME | End: 2022-11-08
Attending: EMERGENCY MEDICINE
Payer: MEDICARE

## 2022-11-08 VITALS
WEIGHT: 175 LBS | SYSTOLIC BLOOD PRESSURE: 121 MMHG | BODY MASS INDEX: 25.92 KG/M2 | TEMPERATURE: 98.4 F | OXYGEN SATURATION: 96 % | HEIGHT: 69 IN | HEART RATE: 80 BPM | RESPIRATION RATE: 18 BRPM | DIASTOLIC BLOOD PRESSURE: 56 MMHG

## 2022-11-08 DIAGNOSIS — M25.559 HIP PAIN: Primary | ICD-10-CM

## 2022-11-08 DIAGNOSIS — M54.32 SCIATICA OF LEFT SIDE: ICD-10-CM

## 2022-11-08 PROCEDURE — 74011250637 HC RX REV CODE- 250/637: Performed by: FAMILY MEDICINE

## 2022-11-08 PROCEDURE — 73502 X-RAY EXAM HIP UNI 2-3 VIEWS: CPT

## 2022-11-08 PROCEDURE — 99283 EMERGENCY DEPT VISIT LOW MDM: CPT

## 2022-11-08 RX ORDER — OXYCODONE AND ACETAMINOPHEN 5; 325 MG/1; MG/1
1 TABLET ORAL
Status: COMPLETED | OUTPATIENT
Start: 2022-11-08 | End: 2022-11-08

## 2022-11-08 RX ORDER — ACETAMINOPHEN AND CODEINE PHOSPHATE 300; 30 MG/1; MG/1
1 TABLET ORAL
Qty: 12 TABLET | Refills: 0 | Status: SHIPPED | OUTPATIENT
Start: 2022-11-08 | End: 2022-11-11

## 2022-11-08 RX ORDER — PREDNISONE 10 MG/1
TABLET ORAL
Qty: 21 TABLET | Refills: 0 | Status: SHIPPED | OUTPATIENT
Start: 2022-11-08

## 2022-11-08 RX ADMIN — OXYCODONE AND ACETAMINOPHEN 1 TABLET: 5; 325 TABLET ORAL at 08:37

## 2022-11-08 NOTE — ED PROVIDER NOTES
Patient is an 79-year-old male with past medical history of hypertension, degenerative disc disease, presenting for evaluation of left hip pain. Reports that he has had pain for approximately 1 year. States that he [place] his [place] Has seen orthopedics with normal imaging. Has additionally done some physical therapy. Reports that he has pain that radiates down his posterior left buttocks into his leg, stopping at about his mid knee. Is having to ambulate with a walker due to increasing pain. Notes that the pain seems has increased over the past week without any fall or injury. Denies any saddle anesthesia, fevers, bowel or bladder incontinence. [facility] and was prescribed acetaminophen with codeine, which does seem to help with symptoms. However, he is running out of this medication. Reports that he has orthopedic follow-up on Friday. Scheduled for a cortisone shot in his spine at the end of November.        Past Medical History:   Diagnosis Date    Hypertension     PUD (peptic ulcer disease)     Pt states he had a 'peptic ulcer' in his early 25s       Past Surgical History:   Procedure Laterality Date    HX CATARACT REMOVAL Bilateral 2015    HX HERNIA REPAIR Right 02/2015    Right inguinal hernia repair    HX KNEE REPLACEMENT Right 11/2016    HX KNEE REPLACEMENT Left 07/2016         Family History:   Problem Relation Age of Onset    Cancer Mother         breast    No Known Problems Father     No Known Problems Brother     No Known Problems Brother        Social History     Socioeconomic History    Marital status:      Spouse name: Not on file    Number of children: Not on file    Years of education: Not on file    Highest education level: Not on file   Occupational History    Not on file   Tobacco Use    Smoking status: Never    Smokeless tobacco: Never   Substance and Sexual Activity    Alcohol use: No     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 Review of Systems   Constitutional:  Negative for fever and unexpected weight change. HENT:  Negative for congestion. Eyes:  Negative for visual disturbance. Respiratory:  Negative for cough, chest tightness and shortness of breath. Cardiovascular:  Negative for chest pain and palpitations. Gastrointestinal:  Negative for abdominal pain, nausea and vomiting. Endocrine: Negative for polyuria. Genitourinary:  Negative for dysuria and flank pain. Musculoskeletal:  Positive for arthralgias. Skin:  Negative for color change. Allergic/Immunologic: Negative for immunocompromised state. Neurological:  Negative for dizziness and headaches. Hematological:  Negative for adenopathy. Psychiatric/Behavioral:  Negative for agitation. Vitals:    11/08/22 0723   BP: (!) 121/56   Pulse: 80   Resp: 18   Temp: 98.4 °F (36.9 °C)   SpO2: 96%   Weight: 79.4 kg (175 lb)   Height: 5' 9\" (1.753 m)            Physical Exam  Vitals and nursing note reviewed. Constitutional:       Appearance: Normal appearance. He is normal weight. HENT:      Head: Atraumatic. Eyes:      Conjunctiva/sclera: Conjunctivae normal.      Pupils: Pupils are equal, round, and reactive to light. Cardiovascular:      Rate and Rhythm: Normal rate. Pulmonary:      Effort: Pulmonary effort is normal. No respiratory distress. Abdominal:      General: Abdomen is flat. Tenderness: There is no abdominal tenderness.    Musculoskeletal:      Cervical back: Neck supple. Back:       Left hip: Tenderness and bony tenderness present. No deformity. Decreased range of motion. Normal strength. Comments: TTP over left SI joint, + Left SLR test   Skin:     General: Skin is warm and dry. Capillary Refill: Capillary refill takes less than 2 seconds. Neurological:      General: No focal deficit present. Mental Status: He is alert and oriented to person, place, and time. Mental status is at baseline. Psychiatric:         Mood and Affect: Mood normal.         Behavior: Behavior normal.        MDM  Number of Diagnoses or Management Options  Hip pain  Sciatica of left side  Diagnosis management comments: Patient presenting with hip pain, lower back pain. He seems to have some hip joint tenderness along with possible sciatica with positive straight leg raise on exam.  Patient states he has not tried any oral steroids for this. Plan to obtain x-ray to rule out bony abnormality due to hip joint tenderness. Low suspicion for cauda equina syndrome given lack of red flag signs and symptoms, history of hip pain going on for approximately 1 year with follow-up with orthopedics. 4561 -x-ray of hip with no acute findings. Plan to refill patient's acetaminophen with codeine temporarily until he can see orthopedics. We will additionally place him on a prednisone taper for suspected sciatica. Patient not diabetic. Discussed my clinical impression(s), any labs and/or radiology results with the patient. I answered any questions and addressed any concerns. Discussed the importance of following up with their primary care physician and/or specialist(s). Discussed signs or symptoms that would warrant return back to the ER for further evaluation. The patient is agreeable with discharge.        Amount and/or Complexity of Data Reviewed  Tests in the radiology section of CPT®: ordered and reviewed  Discuss the patient with other providers: yes (Dr. Kiara Hood, ED Attending)    Patient Progress  Patient progress: stable         Procedures

## 2022-11-08 NOTE — ED TRIAGE NOTES
Pt to ED for c/o L hip pain over the last several days. Took norco PTA. Denies recent injury, trauma fall. Pain radiating from left lateral hip to left leg. Relief when laying on his right side. Patient reports using a walker due to pain     Denies incontinence of bowel or bladder, numbness/tingling. Cpchanel    Has appointment with ortho on Friday.

## 2022-11-08 NOTE — DISCHARGE INSTRUCTIONS
Thank you for allowing us to provide you with medical care today. We realize that you have many choices for your emergency care needs. We thank you for choosing [facility]. Please choose us in the future for any continued health care needs. The exam and treatment you received in the emergency department were for an emergent problem and are not intended as complete care. It is important that you follow-up with a doctor. If your symptoms worsen or you do not improve you should return to the emergency department. We are available 24 hours a day. Please make an appointment with your health care provider for follow-up of your emergency department visit. Take this sheet with you when you go to your follow-up visit.

## 2023-04-06 NOTE — PROGRESS NOTES
1900: Bedside and Verbal shift change report given to Chato Boswell RN (oncoming nurse) by Kimberly Mark RN (offgoing nurse). Report included the following information SBAR, Kardex, ED Summary, Procedure Summary, Intake/Output, MAR, Recent Results and Cardiac Rhythm A-fib    1950: Initial assessments performed, patient resting in bed. Family at bedside. 2L NC. A-fib, HR 60-70's.   2115: Paged out to Dr. Wade Quesada, patient's pressure 85/64, HR 60's-70's with pauses at 1.7 secs. Patient asymptomatic, Dr. Wade Quesada stated if patient is asymptomatic no intervention at this time. Informed that patient currently not getting IV fluids. Orders received for normal saline 75 ml/hr. 2330: Patient's blood pressure periodically down to 70's/50's, currently getting 75/ml hour normal saline. HR periodically dropped to low 40's. Patient is asymptomatic, states he has no pain. No chest pain. Paged out to Dr. Wade Quesada. Orders received to start Phenylnephrine at 5 ml/hr and to maintain MAPs at 65. Will continue to monitor. 0000: Patient has not voided. Bladder scanned: 186 ml. Will continue to monitor. 0400: Paged out to Dr. Wade Quesada to clarify AM lab orders as only BMP ordered for patient. Orders received to draw CBC and Pro-BNP.   0450: Patient has not yet voided. Bladder scanned: 198 ml.   0530: Dr. Wade Quesada in to see patient. Informed of calcium levels. No orders received at this time. 0700: Bedside and Verbal shift change report given to [facility], RN (oncoming nurse) by Chato Boswell RN (offgoing nurse). Report included the following information SBAR, Kardex, ED Summary, Procedure Summary, Intake/Output, MAR, Recent Results and Cardiac Rhythm A-fib. room air

## 2023-07-23 ENCOUNTER — APPOINTMENT (OUTPATIENT)
Facility: HOSPITAL | Age: 88
End: 2023-07-23
Payer: MEDICARE

## 2023-07-23 ENCOUNTER — HOSPITAL ENCOUNTER (EMERGENCY)
Facility: HOSPITAL | Age: 88
Discharge: HOME | End: 2023-07-23
Attending: EMERGENCY MEDICINE
Payer: MEDICARE

## 2023-07-23 VITALS
TEMPERATURE: 98 F | BODY MASS INDEX: 27.64 KG/M2 | DIASTOLIC BLOOD PRESSURE: 95 MMHG | OXYGEN SATURATION: 97 % | HEIGHT: 66 IN | HEART RATE: 90 BPM | SYSTOLIC BLOOD PRESSURE: 178 MMHG | WEIGHT: 172 LBS | RESPIRATION RATE: 16 BRPM

## 2023-07-23 DIAGNOSIS — T14.8XXA MULTIPLE SKIN TEARS: Primary | ICD-10-CM

## 2023-07-23 PROCEDURE — 73130 X-RAY EXAM OF HAND: CPT

## 2023-07-23 PROCEDURE — 6370000000 HC RX 637 (ALT 250 FOR IP): Performed by: EMERGENCY MEDICINE

## 2023-07-23 PROCEDURE — 99283 EMERGENCY DEPT VISIT LOW MDM: CPT

## 2023-07-23 PROCEDURE — 73110 X-RAY EXAM OF WRIST: CPT

## 2023-07-23 RX ORDER — CEPHALEXIN 250 MG/1
500 CAPSULE ORAL
Status: COMPLETED | OUTPATIENT
Start: 2023-07-23 | End: 2023-07-23

## 2023-07-23 RX ORDER — CEPHALEXIN 500 MG/1
500 CAPSULE ORAL 3 TIMES DAILY
Qty: 21 CAPSULE | Refills: 0 | Status: SHIPPED | OUTPATIENT
Start: 2023-07-23 | End: 2023-07-30

## 2023-07-23 RX ADMIN — CEPHALEXIN 500 MG: 250 CAPSULE ORAL at 17:41

## 2023-07-23 ASSESSMENT — PAIN SCALES - GENERAL: PAINLEVEL_OUTOF10: 1

## 2023-07-23 ASSESSMENT — PAIN - FUNCTIONAL ASSESSMENT: PAIN_FUNCTIONAL_ASSESSMENT: 0-10

## 2023-07-23 ASSESSMENT — PAIN DESCRIPTION - DESCRIPTORS: DESCRIPTORS: ACHING

## 2023-07-23 ASSESSMENT — ENCOUNTER SYMPTOMS
COUGH: 0
ABDOMINAL PAIN: 0
TROUBLE SWALLOWING: 0
SHORTNESS OF BREATH: 0

## 2023-07-23 ASSESSMENT — PAIN DESCRIPTION - ORIENTATION: ORIENTATION: LEFT

## 2023-07-23 ASSESSMENT — PAIN DESCRIPTION - LOCATION: LOCATION: HAND

## 2023-07-23 NOTE — ED PROVIDER NOTES
[facility] EMERGENCY DEPT  EMERGENCY DEPARTMENT ENCOUNTER      Pt Name: Jonatan Barboza  MRN: [id number]  [address] [facility] 1935  Date of evaluation: 7/23/2023  Provider: ZOË Khoury - NP    [address]       Chief Complaint   Patient presents with    Laceration         HISTORY OF PRESENT ILLNESS   (Location/Symptom, Timing/Onset, Context/Setting, Quality, Duration, Modifying Factors, Severity)  Note limiting factors. HPI  Patient is an 81-year-old male with past medical history significant for hypertension, osteoarthritis and peptic ulcer disease who presents to the ED with multiple skin tears to both dorsal hands. He states he was sitting in his garage directing his sons while they were cleaning when pieces of lumber fell from the shelf hitting his hands. Bleeding is controlled. There is no obvious new bony deformity. Good neurovascular sensation. No apparent tendon or nerve injury. Tetanus booster is up-to-date. He has not had any medications today prior to arrival.  Old charts reviewed. Review of External Medical Records:     Nursing Notes were reviewed. REVIEW OF SYSTEMS    (2-9 systems for level 4, 10 or more for level 5)     Review of Systems   Constitutional:  Negative for activity change, appetite change, fever and unexpected weight change. HENT:  Negative for congestion and trouble swallowing. Eyes:  Negative for visual disturbance. Respiratory:  Negative for cough and shortness of breath. Gastrointestinal:  Negative for abdominal pain. Musculoskeletal:  Positive for arthralgias. Skin:  Positive for wound. Neurological:  Negative for headaches. All other systems reviewed and are negative. Except as noted above the remainder of the review of systems was reviewed and negative.        PAST MEDICAL HISTORY     Past Medical History:   Diagnosis Date    Hypertension     PUD (peptic ulcer disease)     Pt states he had a 'peptic ulcer' in his early 25s         SURGICAL HISTORY

## 2023-07-23 NOTE — ED TRIAGE NOTES
Pt arrives ambulatory to ED reporting some lumber fell on his left hand today while he was helping clean the garage. Reports a bleeding area to left 5th finger, lateral left hand and left upper forearm. He takes plavix. Last tetanus shot this past year.

## 2023-07-23 NOTE — DISCHARGE INSTRUCTIONS
Use only unscented soaps and lotions on your skin. Apply thin amount of Bactroban to the skin tears after bathing starting tomorrow.

## 2023-08-21 ENCOUNTER — APPOINTMENT (OUTPATIENT)
Facility: HOSPITAL | Age: 88
End: 2023-08-21
Payer: MEDICARE

## 2023-08-21 ENCOUNTER — HOSPITAL ENCOUNTER (EMERGENCY)
Facility: HOSPITAL | Age: 88
Discharge: HOME | End: 2023-08-21
Attending: STUDENT IN AN ORGANIZED HEALTH CARE EDUCATION/TRAINING PROGRAM
Payer: MEDICARE

## 2023-08-21 VITALS
TEMPERATURE: 98.4 F | DIASTOLIC BLOOD PRESSURE: 74 MMHG | OXYGEN SATURATION: 96 % | SYSTOLIC BLOOD PRESSURE: 132 MMHG | WEIGHT: 174 LBS | BODY MASS INDEX: 27.97 KG/M2 | RESPIRATION RATE: 18 BRPM | HEART RATE: 64 BPM | HEIGHT: 66 IN
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
## 2023-08-21 DIAGNOSIS — M25.462 EFFUSION OF LEFT KNEE JOINT: Primary | ICD-10-CM

## 2023-08-21 PROCEDURE — 73562 X-RAY EXAM OF KNEE 3: CPT

## 2023-08-21 PROCEDURE — 99283 EMERGENCY DEPT VISIT LOW MDM: CPT

## 2023-08-21 RX ORDER — ACETAMINOPHEN 325 MG/1
650 TABLET ORAL EVERY 6 HOURS PRN
Qty: 120 TABLET | Refills: 0 | Status: SHIPPED | OUTPATIENT
Start: 2023-08-21

## 2023-08-21 ASSESSMENT — PAIN - FUNCTIONAL ASSESSMENT: PAIN_FUNCTIONAL_ASSESSMENT: NONE - DENIES PAIN

## 2023-08-21 NOTE — ED TRIAGE NOTES
Pt arrives to the ER for complaints of right knee swelling that started a few days ago. Pt reports that about 1.5 weeks ago, he started ciprofloxacin for a wound to his right ankle. Pt reports that he called his provider who prescribed the medication and was told to come into the ER. Denied shortness of breath, cough, chest pain or fevers.

## 2023-08-21 NOTE — DISCHARGE INSTRUCTIONS
You are seen in the emergency department today for knee pain and swelling. Your exam overall was reassuring without signs of infection or gout. Your exam was notable for an effusion with fluid buildup in this knee joint and some associated bruising. Likely this is secondary to the fall that you had a couple days ago. You may take Tylenol 650 mg every 6 hours or 1000 mg every 8 hours as needed for pain. You should rest your knee as able and elevate it to the level of your heart while resting. Call and schedule a follow-up appointment with your orthopedic surgeon and primary care. Please return to the emergency department if you experience increased swelling, uncontrolled pain, new rash, skin discoloration, or other new and concerning symptom.

## 2023-08-22 NOTE — ED PROVIDER NOTES
[facility] EMERGENCY DEPT  EMERGENCY DEPARTMENT ENCOUNTER      Pt Name: Diane Amaya  MRN: [id number]  [address] [facility] 1935  Date of evaluation: 8/21/2023  Provider: Faustino Abdalla MD    CHIEF COMPLAINT       Chief Complaint   Patient presents with    Joint Swelling         HISTORY OF PRESENT ILLNESS    Review of Medical Records: N/A    Nursing Notes were reviewed. HPI    Diane Amaya is a 81 y.o. male with a hx of bilateral knee replacements and atrial fibrillation and CAD on ASA, clopidogrel, and Eliquis who presents to the emergency department for evaluation of right knee swelling. Patient states that he first noted right knee swelling a few days ago. Worsened after a ground level fall onto carpet 2 days ago with marked increase in right knee swelling. Complains of associated aching pain in knee and has been using a cane to assist in ambulation. Not taking anything for pain at home. Denies other recent falls or traumatic injuries. States he just completed a course of ciprofloxacin for a wound on his right ankle from a crowbar falling on it. PAST MEDICAL HISTORY     Past Medical History:   Diagnosis Date    Hypertension     PUD (peptic ulcer disease)     Pt states he had a 'peptic ulcer' in his early 25s         SURGICAL HISTORY       Past Surgical History:   Procedure Laterality Date    CATARACT REMOVAL Bilateral 2015    HERNIA REPAIR Right 02/2015    Right inguinal hernia repair    TOTAL KNEE ARTHROPLASTY Right 11/2016    TOTAL KNEE ARTHROPLASTY Left 07/2016         CURRENT MEDICATIONS       Discharge Medication List as of 8/21/2023  2:42 PM        CONTINUE these medications which have NOT CHANGED    Details   aspirin 81 MG EC tablet Take 81 mg by mouth dailyHistorical Med      atorvastatin (LIPITOR) 40 MG tablet Take 40 mg by mouth dailyHistorical Med      docusate sodium (COLACE) 50 MG capsule Take two tabs twice daily for one week, then twice a day as needed thereafter.   Hold for loose

## 2025-09-05 ENCOUNTER — APPOINTMENT (OUTPATIENT)
Facility: HOSPITAL | Age: 89
End: 2025-09-05
Payer: MEDICARE

## 2025-09-05 ENCOUNTER — HOSPITAL ENCOUNTER (EMERGENCY)
Facility: HOSPITAL | Age: 89
Discharge: HOME | End: 2025-09-05
Attending: EMERGENCY MEDICINE
Payer: MEDICARE

## 2025-09-05 VITALS
SYSTOLIC BLOOD PRESSURE: 123 MMHG | HEART RATE: 69 BPM | TEMPERATURE: 97.9 F | OXYGEN SATURATION: 98 % | DIASTOLIC BLOOD PRESSURE: 80 MMHG | RESPIRATION RATE: 18 BRPM
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
## 2025-09-05 DIAGNOSIS — R07.9 CHEST PAIN, UNSPECIFIED TYPE: ICD-10-CM

## 2025-09-05 LAB
ALBUMIN SERPL-MCNC: 3.9 G/DL (ref 3.5–5.2)
ALBUMIN/GLOB SERPL: 1.1 (ref 1.1–2.2)
ALP SERPL-CCNC: 105 U/L (ref 40–129)
ALT SERPL-CCNC: 36 U/L (ref 10–50)
ANION GAP SERPL CALC-SCNC: 11 MMOL/L (ref 2–14)
AST SERPL-CCNC: 34 U/L (ref 10–50)
BASOPHILS # BLD: 0.04 K/UL (ref 0–0.1)
BASOPHILS NFR BLD: 0.7 % (ref 0–1)
BILIRUB SERPL-MCNC: 0.4 MG/DL (ref 0–1.2)
BUN SERPL-MCNC: 16 MG/DL (ref 8–23)
BUN/CREAT SERPL: 18 (ref 12–20)
CALCIUM SERPL-MCNC: 9.6 MG/DL (ref 8.8–10.2)
CHLORIDE SERPL-SCNC: 105 MMOL/L (ref 98–107)
CO2 SERPL-SCNC: 26 MMOL/L (ref 20–29)
COMMENT:: NORMAL
CREAT SERPL-MCNC: 0.88 MG/DL (ref 0.7–1.2)
DIFFERENTIAL METHOD BLD: NORMAL
EOSINOPHIL # BLD: 0.1 K/UL (ref 0–0.4)
EOSINOPHIL NFR BLD: 1.6 % (ref 0–7)
ERYTHROCYTE [DISTWIDTH] IN BLOOD BY AUTOMATED COUNT: 13.4 % (ref 11.5–14.5)
GLOBULIN SER CALC-MCNC: 3.5 G/DL (ref 2–4)
GLUCOSE SERPL-MCNC: 83 MG/DL (ref 65–100)
HCT VFR BLD AUTO: 42 % (ref 36.6–50.3)
HGB BLD-MCNC: 14.4 G/DL (ref 12.1–17)
IMM GRANULOCYTES # BLD AUTO: 0.02 K/UL (ref 0–0.04)
IMM GRANULOCYTES NFR BLD AUTO: 0.3 % (ref 0–0.5)
LYMPHOCYTES # BLD: 1.6 K/UL (ref 0.8–3.5)
LYMPHOCYTES NFR BLD: 26.4 % (ref 12–49)
MAGNESIUM SERPL-MCNC: 2 MG/DL (ref 1.6–2.4)
MCH RBC QN AUTO: 31.3 PG (ref 26–34)
MCHC RBC AUTO-ENTMCNC: 34.3 G/DL (ref 30–36.5)
MCV RBC AUTO: 91.3 FL (ref 80–99)
MONOCYTES # BLD: 0.64 K/UL (ref 0–1)
MONOCYTES NFR BLD: 10.5 % (ref 5–13)
NEUTS SEG # BLD: 3.67 K/UL (ref 1.8–8)
NEUTS SEG NFR BLD: 60.5 % (ref 32–75)
NRBC # BLD: 0 K/UL (ref 0–0.01)
NRBC BLD-RTO: 0 PER 100 WBC
PLATELET # BLD AUTO: 203 K/UL (ref 150–400)
PMV BLD AUTO: 9.7 FL (ref 8.9–12.9)
POTASSIUM SERPL-SCNC: 4 MMOL/L (ref 3.5–5.1)
PROT SERPL-MCNC: 7.3 G/DL (ref 6.4–8.3)
RBC # BLD AUTO: 4.6 M/UL (ref 4.1–5.7)
SODIUM SERPL-SCNC: 142 MMOL/L (ref 136–145)
SPECIMEN HOLD: NORMAL
TROPONIN T SERPL HS-MCNC: 17.1 NG/L (ref 0–22)
TROPONIN T SERPL HS-MCNC: 17.6 NG/L (ref 0–22)
WBC # BLD AUTO: 6.1 K/UL (ref 4.1–11.1)

## 2025-09-05 PROCEDURE — 80053 COMPREHEN METABOLIC PANEL: CPT

## 2025-09-05 PROCEDURE — 36415 COLL VENOUS BLD VENIPUNCTURE: CPT

## 2025-09-05 PROCEDURE — 71046 X-RAY EXAM CHEST 2 VIEWS: CPT

## 2025-09-05 PROCEDURE — 70450 CT HEAD/BRAIN W/O DYE: CPT

## 2025-09-05 PROCEDURE — 84484 ASSAY OF TROPONIN QUANT: CPT

## 2025-09-05 PROCEDURE — 85025 COMPLETE CBC W/AUTO DIFF WBC: CPT

## 2025-09-05 PROCEDURE — 6360000002 HC RX W HCPCS: Performed by: EMERGENCY MEDICINE

## 2025-09-05 PROCEDURE — 83735 ASSAY OF MAGNESIUM: CPT

## 2025-09-05 RX ORDER — KETOROLAC TROMETHAMINE 15 MG/ML
15 INJECTION, SOLUTION INTRAMUSCULAR; INTRAVENOUS
Status: COMPLETED | OUTPATIENT
Start: 2025-09-05 | End: 2025-09-05

## 2025-09-05 RX ADMIN — KETOROLAC TROMETHAMINE 15 MG: 15 INJECTION, SOLUTION INTRAMUSCULAR; INTRAVENOUS at 19:34

## 2025-09-05 ASSESSMENT — PAIN SCALES - GENERAL
PAINLEVEL_OUTOF10: 0
PAINLEVEL_OUTOF10: 0
PAINLEVEL_OUTOF10: 3

## 2025-09-05 ASSESSMENT — PAIN - FUNCTIONAL ASSESSMENT
PAIN_FUNCTIONAL_ASSESSMENT: 0-10
PAIN_FUNCTIONAL_ASSESSMENT: 0-10

## (undated) DEVICE — SOLUTION IV 500ML 0.9% SOD CHL FLX CONT

## (undated) DEVICE — TELFA ADHESIVE ISLAND DRESSING: Brand: TELFA

## (undated) DEVICE — STRAP,POSITIONING,KNEE/BODY,FOAM,4X60": Brand: MEDLINE

## (undated) DEVICE — CONTAINER,SPECIMEN,3OZ,OR STRL: Brand: MEDLINE

## (undated) DEVICE — DRAPE XR C ARM 41X74IN LF --

## (undated) DEVICE — TOOL DC SP12MH30 LGD 12CM PROX 3.0 MH: Brand: MIDAS REX CLEARVIEW™

## (undated) DEVICE — COVER,TABLE,60X90,STERILE: Brand: MEDLINE

## (undated) DEVICE — BANDAGE,GAUZE,CONFORMING,2"X75",STRL,LF: Brand: MEDLINE INDUSTRIES, INC.

## (undated) DEVICE — DRESSING,GAUZE,XEROFORM,CURAD,1"X8",ST: Brand: CURAD

## (undated) DEVICE — SUTURE VCRL SZ 3-0 L18IN ABSRB UD L26MM SH 1/2 CIR J864D

## (undated) DEVICE — NEEDLE SPNL 20GA L3.5IN YEL HUB S STL REG WALL FIT STYL W/

## (undated) DEVICE — SYR 3ML LL TIP 1/10ML GRAD --

## (undated) DEVICE — CODMAN® SURGICAL PATTIES 3/4" X 3/4" (1.91CM X 1.91CM): Brand: CODMAN®

## (undated) DEVICE — ZIMMER® STERILE DISPOSABLE TOURNIQUET CUFF WITH PROTECTIVE SLEEVE AND PLC, DUAL PORT, SINGLE BLADDER, 18 IN. (46 CM)

## (undated) DEVICE — CATHETER IV 18GA L1.25IN FEP STR HUB INTROCAN SFTY

## (undated) DEVICE — DEVON™ KNEE AND BODY STRAP 60" X 3" (1.5 M X 7.6 CM): Brand: DEVON

## (undated) DEVICE — LAMINECTOMY RICHMOND-LF: Brand: MEDLINE INDUSTRIES, INC.

## (undated) DEVICE — INFECTION CONTROL KIT SYS

## (undated) DEVICE — NEEDLE HYPO 25GA L1.5IN BVL ORIENTED ECLIPSE

## (undated) DEVICE — PILLOW POS AD L7IN R FOAM HD REST INTUB SLOT DISP

## (undated) DEVICE — SUTURE VCRL SZ 2-0 L18IN ABSRB UD L26MM CP-2 1/2 CIR REV J762D

## (undated) DEVICE — MEDI-VAC NON-CONDUCTIVE SUCTION TUBING: Brand: CARDINAL HEALTH

## (undated) DEVICE — INTENDED FOR TISSUE SEPARATION, AND OTHER PROCEDURES THAT REQUIRE A SHARP SURGICAL BLADE TO PUNCTURE OR CUT.: Brand: BARD-PARKER ® CARBON RIB-BACK BLADES

## (undated) DEVICE — HAND I-LF: Brand: MEDLINE INDUSTRIES, INC.

## (undated) DEVICE — PREP SKN PREVAIL 40ML APPL --

## (undated) DEVICE — DRAPE,U/ SHT,SPLIT,PLAS,STERIL: Brand: MEDLINE

## (undated) DEVICE — SUTURE MCRYL SZ 4 0 L18IN ABSRB VLT PS 1 L24MM 3 8 CIR REV Y682H

## (undated) DEVICE — SOL IRRIGATION INJ NACL 0.9% 500ML BTL

## (undated) DEVICE — MAGNETIC DRAPE: Brand: DEVON

## (undated) DEVICE — BLADE ELECTRODE: Brand: EDGE

## (undated) DEVICE — DRAPE,REIN 53X77,STERILE: Brand: MEDLINE

## (undated) DEVICE — KENDALL SCD EXPRESS SLEEVES, KNEE LENGTH, MEDIUM: Brand: KENDALL SCD

## (undated) DEVICE — 1010 S-DRAPE TOWEL DRAPE 10/BX: Brand: STERI-DRAPE™

## (undated) DEVICE — SKIN PREP TRAY W/CHG: Brand: MEDLINE INDUSTRIES, INC.

## (undated) DEVICE — COVER LT HNDL PLAS RIG 1 PER PK

## (undated) DEVICE — TELFA NON-ADHERENT ABSORBENT DRESSING: Brand: TELFA

## (undated) DEVICE — GAUZE SPONGES,12 PLY: Brand: CURITY

## (undated) DEVICE — TOOL 14MH30 LEGEND 14CM 3MM: Brand: MIDAS REX ™

## (undated) DEVICE — BIPOLAR FORCEPS CORD: Brand: VALLEYLAB

## (undated) DEVICE — STERILE POLYISOPRENE POWDER-FREE SURGICAL GLOVES WITH EMOLLIENT COATING: Brand: PROTEXIS

## (undated) DEVICE — TIP CLEANER: Brand: VALLEYLAB

## (undated) DEVICE — SYRINGE MED 20ML STD CLR PLAS LUERLOCK TIP N CTRL DISP

## (undated) DEVICE — FLOSEAL HEMOSTATIC MATRIX, 10 ML: Brand: FLOSEAL

## (undated) DEVICE — BONE WAX WHITE: Brand: BONE WAX WHITE

## (undated) DEVICE — PACKING 8004000 NEURAY 200PK 13X13MM: Brand: NEURAY ®

## (undated) DEVICE — VESSEL LOOPS,MINI, RED: Brand: DEVON

## (undated) DEVICE — SUT PROL 5-0 18IN P3 BLU --

## (undated) DEVICE — STERILE POLYISOPRENE POWDER-FREE SURGICAL GLOVES: Brand: PROTEXIS

## (undated) DEVICE — SOLUTION IV 1000ML 0.9% SOD CHL

## (undated) DEVICE — DERMABOND SKIN ADH 0.7ML -- DERMABOND ADVANCED 12/BX

## (undated) DEVICE — SYR 5ML 1/5 GRAD LL NSAF LF --

## (undated) DEVICE — BNDG ADHESIVE FABRIC 2X3.5IN -- CONVERT TO ITEM 357955

## (undated) DEVICE — TRAY CATH 16F DRN BG LTX -- CONVERT TO ITEM 363158

## (undated) DEVICE — 1200 GUARD II KIT W/5MM TUBE W/O VAC TUBE: Brand: GUARDIAN

## (undated) DEVICE — CORD ELECSURG BPLR 12 FT DISP [810T818750] [ADLER INSTRUMENT CO]